# Patient Record
Sex: MALE | Race: WHITE | NOT HISPANIC OR LATINO | Employment: FULL TIME | URBAN - METROPOLITAN AREA
[De-identification: names, ages, dates, MRNs, and addresses within clinical notes are randomized per-mention and may not be internally consistent; named-entity substitution may affect disease eponyms.]

---

## 2018-11-06 ENCOUNTER — OFFICE VISIT (OUTPATIENT)
Dept: FAMILY MEDICINE | Facility: CLINIC | Age: 28
End: 2018-11-06
Payer: COMMERCIAL

## 2018-11-06 VITALS
BODY MASS INDEX: 40.42 KG/M2 | HEIGHT: 73 IN | DIASTOLIC BLOOD PRESSURE: 84 MMHG | RESPIRATION RATE: 20 BRPM | SYSTOLIC BLOOD PRESSURE: 136 MMHG | WEIGHT: 305 LBS | HEART RATE: 82 BPM | OXYGEN SATURATION: 99 %

## 2018-11-06 DIAGNOSIS — Z23 NEED FOR PROPHYLACTIC VACCINATION AND INOCULATION AGAINST INFLUENZA: ICD-10-CM

## 2018-11-06 DIAGNOSIS — Z23 NEED FOR PROPHYLACTIC VACCINATION WITH TETANUS-DIPHTHERIA (TD): ICD-10-CM

## 2018-11-06 DIAGNOSIS — Z00.00 ROUTINE HISTORY AND PHYSICAL EXAMINATION OF ADULT: Primary | ICD-10-CM

## 2018-11-06 DIAGNOSIS — Z13.220 LIPID SCREENING: ICD-10-CM

## 2018-11-06 DIAGNOSIS — Z13.1 SCREENING FOR DIABETES MELLITUS: ICD-10-CM

## 2018-11-06 LAB
ANION GAP SERPL CALCULATED.3IONS-SCNC: 10 MMOL/L (ref 3–14)
BASOPHILS # BLD AUTO: 0 10E9/L (ref 0–0.2)
BASOPHILS NFR BLD AUTO: 0.3 %
BUN SERPL-MCNC: 18 MG/DL (ref 7–30)
CALCIUM SERPL-MCNC: 9.4 MG/DL (ref 8.5–10.1)
CHLORIDE SERPL-SCNC: 106 MMOL/L (ref 94–109)
CO2 SERPL-SCNC: 24 MMOL/L (ref 20–32)
CREAT SERPL-MCNC: 0.89 MG/DL (ref 0.66–1.25)
DIFFERENTIAL METHOD BLD: NORMAL
EOSINOPHIL # BLD AUTO: 0.1 10E9/L (ref 0–0.7)
EOSINOPHIL NFR BLD AUTO: 0.8 %
ERYTHROCYTE [DISTWIDTH] IN BLOOD BY AUTOMATED COUNT: 12.7 % (ref 10–15)
GFR SERPL CREATININE-BSD FRML MDRD: >90 ML/MIN/1.7M2
GLUCOSE SERPL-MCNC: 89 MG/DL (ref 70–99)
HCT VFR BLD AUTO: 45 % (ref 40–53)
HGB BLD-MCNC: 14.7 G/DL (ref 13.3–17.7)
LYMPHOCYTES # BLD AUTO: 3.9 10E9/L (ref 0.8–5.3)
LYMPHOCYTES NFR BLD AUTO: 37.8 %
MCH RBC QN AUTO: 28.7 PG (ref 26.5–33)
MCHC RBC AUTO-ENTMCNC: 32.7 G/DL (ref 31.5–36.5)
MCV RBC AUTO: 88 FL (ref 78–100)
MONOCYTES # BLD AUTO: 0.9 10E9/L (ref 0–1.3)
MONOCYTES NFR BLD AUTO: 8.2 %
NEUTROPHILS # BLD AUTO: 5.5 10E9/L (ref 1.6–8.3)
NEUTROPHILS NFR BLD AUTO: 52.9 %
PLATELET # BLD AUTO: 289 10E9/L (ref 150–450)
POTASSIUM SERPL-SCNC: 3.8 MMOL/L (ref 3.4–5.3)
RBC # BLD AUTO: 5.12 10E12/L (ref 4.4–5.9)
SODIUM SERPL-SCNC: 140 MMOL/L (ref 133–144)
WBC # BLD AUTO: 10.4 10E9/L (ref 4–11)

## 2018-11-06 PROCEDURE — 99385 PREV VISIT NEW AGE 18-39: CPT | Performed by: NURSE PRACTITIONER

## 2018-11-06 PROCEDURE — 80048 BASIC METABOLIC PNL TOTAL CA: CPT | Performed by: NURSE PRACTITIONER

## 2018-11-06 PROCEDURE — 85025 COMPLETE CBC W/AUTO DIFF WBC: CPT | Performed by: NURSE PRACTITIONER

## 2018-11-06 PROCEDURE — 36415 COLL VENOUS BLD VENIPUNCTURE: CPT | Performed by: NURSE PRACTITIONER

## 2018-11-06 ASSESSMENT — ENCOUNTER SYMPTOMS
DYSURIA: 0
HEADACHES: 0
DIZZINESS: 0
ABDOMINAL PAIN: 0
HEMATURIA: 0
CHILLS: 0
NERVOUS/ANXIOUS: 0
HEARTBURN: 0
CONSTIPATION: 0
HEMATOCHEZIA: 0
SORE THROAT: 0
ARTHRALGIAS: 1
MYALGIAS: 0
FEVER: 0
EYE PAIN: 0
PALPITATIONS: 0
DIARRHEA: 0
PARESTHESIAS: 0
WEAKNESS: 0
COUGH: 0
SHORTNESS OF BREATH: 0
JOINT SWELLING: 0
NAUSEA: 0
FREQUENCY: 0

## 2018-11-06 ASSESSMENT — PAIN SCALES - GENERAL: PAINLEVEL: NO PAIN (0)

## 2018-11-06 NOTE — LETTER
[facility]  [address] [address] [phone]                                                                                                November 7, 2018    Temo Gallego  [address]  [place] [address]        Dear Mr. Gallego,    The results of your recent lab tests were within normal limits. Enclosed is a copy of these results.  If you have any further questions or problems, please contact our office.    Results for orders placed or performed in visit on 11/06/18   CBC with platelets differential   Result Value Ref Range    WBC 10.4 4.0 - 11.0 10e9/L    RBC Count 5.12 4.4 - 5.9 10e12/L    Hemoglobin 14.7 13.3 - 17.7 g/dL    Hematocrit 45.0 40.0 - 53.0 %    MCV 88 78 - 100 fl    MCH 28.7 26.5 - 33.0 pg    MCHC 32.7 31.5 - 36.5 g/dL    RDW 12.7 10.0 - 15.0 %    Platelet Count 289 150 - 450 10e9/L    % Neutrophils 52.9 %    % Lymphocytes 37.8 %    % Monocytes 8.2 %    % Eosinophils 0.8 %    % Basophils 0.3 %    Absolute Neutrophil 5.5 1.6 - 8.3 10e9/L    Absolute Lymphocytes 3.9 0.8 - 5.3 10e9/L    Absolute Monocytes 0.9 0.0 - 1.3 10e9/L    Absolute Eosinophils 0.1 0.0 - 0.7 10e9/L    Absolute Basophils 0.0 0.0 - 0.2 10e9/L    Diff Method Automated Method    Basic metabolic panel   Result Value Ref Range    Sodium 140 133 - 144 mmol/L    Potassium 3.8 3.4 - 5.3 mmol/L    Chloride 106 94 - 109 mmol/L    Carbon Dioxide 24 20 - 32 mmol/L    Anion Gap 10 3 - 14 mmol/L    Glucose 89 70 - 99 mg/dL    Urea Nitrogen 18 7 - 30 mg/dL    Creatinine 0.89 0.66 - 1.25 mg/dL    GFR Estimate >90 >60 mL/min/1.7m2    GFR Estimate If Black >90 >60 mL/min/1.7m2    Calcium 9.4 8.5 - 10.1 mg/dL     Sincerely,    Almita Rowland, NP /mv

## 2018-11-06 NOTE — PROGRESS NOTES
SUBJECTIVE:   CC: Temo Gallego is an 28 year old male who presents for preventative health visit.     Physical   Annual:     Getting at least 3 servings of Calcium per day:  Yes    Bi-annual eye exam:  Yes    Dental care twice a year:  NO    Sleep apnea or symptoms of sleep apnea:  None    Diet:  Regular (no restrictions)    Frequency of exercise:  2-3 days/week    Duration of exercise:  15-30 minutes    Taking medications regularly:  Yes    Medication side effects:  None    Additional concerns today:  Yes            Today's PHQ-2 Score:   PHQ-2 ( 1999 Pfizer) 11/6/2018   Q1: Little interest or pleasure in doing things 0   Q2: Feeling down, depressed or hopeless 0   PHQ-2 Score 0   Q1: Little interest or pleasure in doing things Not at all   Q2: Feeling down, depressed or hopeless Not at all   PHQ-2 Score 0       Abuse: Current or Past(Physical, Sexual or Emotional)- NO  Do you feel safe in your environment - YES    Social History   Substance Use Topics     Smoking status: Not on file     Smokeless tobacco: Not on file     Alcohol use Not on file     Alcohol Use 11/6/2018   If you drink alcohol do you typically have greater than 3 drinks per day OR greater than 7 drinks per week? No   No flowsheet data found.    Last PSA: No results found for: PSA    Reviewed orders with patient. Reviewed health maintenance and updated orders accordingly - Yes      Reviewed and updated as needed this visit by clinical staff         Reviewed and updated as needed this visit by Provider        {HISTORY OPTIONS (Optional):633742}    Review of Systems   Constitutional: Negative for chills and fever.   HENT: Negative for congestion, ear pain, hearing loss and sore throat.    Eyes: Negative for pain and visual disturbance.   Respiratory: Negative for cough and shortness of breath.    Cardiovascular: Negative for chest pain, palpitations and peripheral edema.   Gastrointestinal: Negative for abdominal pain, constipation, diarrhea,  "heartburn, hematochezia and nausea.   Genitourinary: Negative for discharge, dysuria, frequency, genital sores, hematuria, impotence and urgency.   Musculoskeletal: Positive for arthralgias. Negative for joint swelling and myalgias.   Skin: Negative for rash.   Neurological: Negative for dizziness, weakness, headaches and paresthesias.   Psychiatric/Behavioral: Negative for mood changes. The patient is not nervous/anxious.      {MALE ROS (Optional):759457::\"CONSTITUTIONAL: NEGATIVE for fever, chills, change in weight\",\"INTEGUMENTARY/SKIN: NEGATIVE for worrisome rashes, moles or lesions\",\"EYES: NEGATIVE for vision changes or irritation\",\"ENT: NEGATIVE for ear, mouth and throat problems\",\"RESP: NEGATIVE for significant cough or SOB\",\"CV: NEGATIVE for chest pain, palpitations or peripheral edema\",\"GI: NEGATIVE for nausea, abdominal pain, heartburn, or change in bowel habits\",\" male: negative for dysuria, hematuria, decreased urinary stream, erectile dysfunction, urethral discharge\",\"MUSCULOSKELETAL: NEGATIVE for significant arthralgias or myalgia\",\"NEURO: NEGATIVE for weakness, dizziness or paresthesias\",\"PSYCHIATRIC: NEGATIVE for changes in mood or affect\"}    OBJECTIVE:   There were no vitals taken for this visit.    Physical Exam  {Exam Choices (Optional):170478}    {Diagnostic Test Results (Optional):252717::\"Diagnostic Test Results:\",\"none \"}    ASSESSMENT/PLAN:   {Diag Picklist:538103}    COUNSELING:   {MALE COUNSELING MESSAGES:825946::\"Reviewed preventive health counseling, as reflected in patient instructions\"}    BP Readings from Last 1 Encounters:   No data found for BP     There is no height or weight on file to calculate BMI.    {BP Counseling- Complete if BP >= 120/80  (Optional):539717}  {Weight Management Plan (ACO) Complete if BMI is abnormal-  Ages 18-64  BMI >24.9.  Age 65+ with BMI <23 or >30 (Optional):444557}     has no tobacco history on file.  {Tobacco Cessation -- Complete if patient is a " smoker (Optional):561146}    Counseling Resources:  ATP IV Guidelines  Pooled Cohorts Equation Calculator  FRAX Risk Assessment  ICSI Preventive Guidelines  Dietary Guidelines for Americans, 2010  USDA's MyPlate  ASA Prophylaxis  Lung CA Screening    RODRIGUE [facility]  Answers for HPI/ROS submitted by the patient on 11/6/2018   PHQ-2 Score: 0

## 2018-11-06 NOTE — MR AVS SNAPSHOT
After Visit Summary   11/6/2018    Temo Gallego    MRN: [id number]           Patient Information     Date Of Birth          1990        Visit Information        Provider Department      11/6/2018 7:20 AM Almita Rowland APRN [facility]        Today's Diagnoses     Routine history and physical examination of adult    -  1    Body mass index (BMI) of 40.0-44.9 in adult (H)        Need for prophylactic vaccination and inoculation against influenza        Need for prophylactic vaccination with tetanus-diphtheria (Td)        Lipid screening        Screening for diabetes mellitus          Care Instructions      Preventive Health Recommendations  Male Ages 26 - 39    Yearly exam:             See your health care provider every year in order to  o   Review health changes.   o   Discuss preventive care.    o   Review your medicines if your doctor has prescribed any.    You should be tested each year for STDs (sexually transmitted diseases), if you re at risk.     After age 35, talk to your provider about cholesterol testing. If you are at risk for heart disease, have your cholesterol tested at least every 5 years.     If you are at risk for diabetes, you should have a diabetes test (fasting glucose).  Shots: Get a flu shot each year. Get a tetanus shot every 10 years.     Nutrition:    Eat at least 5 servings of fruits and vegetables daily.     Eat whole-grain bread, whole-wheat pasta and brown rice instead of white grains and rice.     Get adequate Calcium and Vitamin D.     Lifestyle    Exercise for at least 150 minutes a week (30 minutes a day, 5 days a week). This will help you control your weight and prevent disease.     Limit alcohol to one drink per day.     No smoking.     Wear sunscreen to prevent skin cancer.     See your dentist every six months for an exam and cleaning.   [facility]   Discharged by : Huma Spencer CMA    If you have any  questions regarding your visit please contact your care team:     Team Gold                Clinic Hours Telephone Number     Dr. Tere Tanner, ASA Rowland, CNP 7am-7pm  Monday - Thursday   7am-5pm  Fridays  [phone]   (Appointment scheduling available 24/7)     RN Line  [phone] option 2     Urgent Care - [place] - 11am-9pm Monday-Friday Saturday-Sunday- 9am-5pm     [place] -   5pm-9pm Monday-Friday Saturday-Sunday- 9am-5pm    [phone] - [place]    [phone] - [place]       For a Price Quote for your services, please call our Consumer Price Line at [phone].     What options do I have for visits at the clinic other than the traditional office visit?     To expand how we care for you, many of our providers are utilizing electronic visits (e-visits) and telephone visits, when medically appropriate, for interactions with their patients rather than a visit in the clinic. We also offer nurse visits for many medical concerns. Just like any other service, we will bill your insurance company for this type of visit based on time spent on the phone with your provider. Not all insurance companies cover these visits. Please check with your medical insurance if this type of visit is covered. You will be responsible for any charges that are not paid by your insurance.   E-visits via LUVHAN: generally incur a $35.00 fee.     Telephone visits:  Time spent on the phone: *charged based on time that is spent on the phone in increments of 10 minutes. Estimated cost:   5-10 mins $30.00   11-20 mins. $59.00   21-30 mins. $85.00       Use Noxilizert (secure email communication and access to your chart) to send your primary care provider a message or make an appointment. Ask someone on your Team how to sign up for Noxilizert.     As always, Thank you for trusting us with your health care needs!      [facility]:    Scheduling Appointments  [facility]  Call: [phone]    [facility]  Call: [phone]    [facility]  Call: [phone]    For Gastroenterology referrals   [facility]   Clinics and Surgery Center, 4th Floor   [address] [place] [address]   Appointments: [phone]    WHERE TO GO FOR CARE?  Clinic    Make an appointment if you:       Are sick (cold, cough, flu, sore throat, earache or in pain).       Have a small injury (sprain, small cut, burn or broken bone).       Need a physical exam, Pap smear, vaccine or prescription refill.       Have questions about your health or medicines.    To reach us:      Call [phone] ([phone]). Open 24 hours every day. (For counseling services, call [phone].)    Log into AppMesh at Spark The Fire. (Visit Workpop to create an account.) Hospital emergency room    An emergency is a serious or life- threatening problem that must be treated right away.    Call 431 or get to the hospital if you have:      Very bad or sudden:            - Chest pain or pressure         - Bleeding         - Head or belly pain         - Dizziness or trouble seeing, walking or                          Speaking      Problems breathing      Blood in your vomit or you are coughing up blood      A major injury (knocked out, loss of a finger or limb, rape, broken bone protruding from skin)    A mental health crisis. (Or call the Mental Health Crisis line at 1-551.841.4940 or Suicide Prevention Hotline at 1-784.670.9723.)    Open 24 hours every day. You don't need an appointment.     Urgent care    Visit urgent care for sickness or small injuries when the clinic is closed. You don't need an appointment. To check hours or find an urgent care near you, visit [email or web address]. Online care    Get online care from [facility] for more than 70 common  "problems, like colds, allergies and infections. Open 24 hours every day at:   [email or web address]   Need help deciding?    For advice about where to be seen, you may call your clinic and ask to speak with a nurse. We're here for you 24 hours every day.         If you are deaf or hard of hearing, please let us know. We provide many free services including sign language interpreters, oral interpreters, TTYs, telephone amplifiers, note takers and written materials.                   Follow-ups after your visit        Follow-up notes from your care team     Return if symptoms worsen or fail to improve.      Future tests that were ordered for you today     Open Future Orders        Priority Expected Expires Ordered    Lipid panel reflex to direct LDL Fasting Routine 12/4/2018 1/4/2019 11/6/2018            Who to contact     If you have questions or need follow up information about today's clinic visit or your schedule please contact [facility] directly at [phone].  Normal or non-critical lab and imaging results will be communicated to you by MyChart, letter or phone within 4 business days after the clinic has received the results. If you do not hear from us within 7 days, please contact the clinic through Fulhamhart or phone. If you have a critical or abnormal lab result, we will notify you by phone as soon as possible.  Submit refill requests through CanWeNetwork or call your pharmacy and they will forward the refill request to us. Please allow 3 business days for your refill to be completed.          Additional Information About Your Visit        FulhamharGamePlan Technologies Information     CanWeNetwork lets you send messages to your doctor, view your test results, renew your prescriptions, schedule appointments and more. To sign up, go to [email or web address] . Click on \"Log in\" on the left side of the screen, which will take you to the Welcome page. Then click on \"Sign up Now\" on the right side of the page.     You will be " "asked to enter the access code listed below, as well as some personal information. Please follow the directions to create your username and password.     Your access code is: [email or web address]  Expires: 2019  7:16 AM     Your access code will  in 90 days. If you need help or a new code, please call your [facility] or [phone].        Care EveryWhere ID     This is your Care EveryWhere ID. This could be used by other organizations to access your [place] medical records  [id number]        Your Vitals Were     Pulse Respirations Height Pulse Oximetry BMI (Body Mass Index)       82 20 6' 1.2\" (1.859 m) 99% 40.02 kg/m2        Blood Pressure from Last 3 Encounters:   18 136/84    Weight from Last 3 Encounters:   18 305 lb (138.3 kg)              We Performed the Following     Basic metabolic panel     CBC with platelets differential        Primary Care Provider Office Phone # Fax #    [facility] [phone]       [address] [place] [address]        Equal Access to Services     RYAN DOWNING : Hadii aad ku hadasho Soomaali, waaxda luqadaha, qaybta kaalmada adejoniyada, hilaria nair . So [facility] [phone].    ATENCIÓN: Si habla español, tiene a brunson disposición servicios gratuitos de asistencia lingüística. Joseph al [phone].    We comply with applicable federal civil rights laws and [place] laws. We do not discriminate on the basis of race, color, national origin, age, disability, sex, sexual orientation, or gender identity.            Thank you!     Thank you for choosing [facility]  for your care. Our goal is always to provide you with excellent care. Hearing back from our patients is one way we can continue to improve our services. Please take a few minutes to complete the written survey that you may receive in the mail after your visit with us. Thank you!             Your Updated Medication List - " Protect others around you: Learn how to safely use, store and throw away your medicines at www.disposemymeds.org.      Notice  As of 11/6/2018 10:10 AM    You have not been prescribed any medications.

## 2018-11-06 NOTE — PATIENT INSTRUCTIONS
Preventive Health Recommendations  Male Ages 26 - 39    Yearly exam:             See your health care provider every year in order to  o   Review health changes.   o   Discuss preventive care.    o   Review your medicines if your doctor has prescribed any.    You should be tested each year for STDs (sexually transmitted diseases), if you re at risk.     After age 35, talk to your provider about cholesterol testing. If you are at risk for heart disease, have your cholesterol tested at least every 5 years.     If you are at risk for diabetes, you should have a diabetes test (fasting glucose).  Shots: Get a flu shot each year. Get a tetanus shot every 10 years.     Nutrition:    Eat at least 5 servings of fruits and vegetables daily.     Eat whole-grain bread, whole-wheat pasta and brown rice instead of white grains and rice.     Get adequate Calcium and Vitamin D.     Lifestyle    Exercise for at least 150 minutes a week (30 minutes a day, 5 days a week). This will help you control your weight and prevent disease.     Limit alcohol to one drink per day.     No smoking.     Wear sunscreen to prevent skin cancer.     See your dentist every six months for an exam and cleaning.   [facility]   Discharged by : Huma Spencer CMA    If you have any questions regarding your visit please contact your care team:     Team [facility] Hours Telephone Number     Dr. Tere Tanner, ASA Rowland, CNP 7am-7pm  Monday - Thursday   7am-5pm  Fridays  [phone]   (Appointment scheduling available 24/7)     RN Line  [phone] option 2     Urgent Care - [place] and [place] - 11am-9pm Monday-Friday Saturday-Sunday- 9am-5pm     [place] -   5pm-9pm Monday-Friday Saturday-Sunday- 9am-5pm    [phone] - [place]i [place]    [phone] - Sukhjinder       For a Price Quote for your services, please call our  Consumer Simons Line at [phone].     What options do I have for visits at the clinic other than the traditional office visit?     To expand how we care for you, many of our providers are utilizing electronic visits (e-visits) and telephone visits, when medically appropriate, for interactions with their patients rather than a visit in the clinic. We also offer nurse visits for many medical concerns. Just like any other service, we will bill your insurance company for this type of visit based on time spent on the phone with your provider. Not all insurance companies cover these visits. Please check with your medical insurance if this type of visit is covered. You will be responsible for any charges that are not paid by your insurance.   E-visits via Molecular Detection: generally incur a $35.00 fee.     Telephone visits:  Time spent on the phone: *charged based on time that is spent on the phone in increments of 10 minutes. Estimated cost:   5-10 mins $30.00   11-20 mins. $59.00   21-30 mins. $85.00       Use Molecular Detection (secure email communication and access to your chart) to send your primary care provider a message or make an appointment. Ask someone on your Team how to sign up for Molecular Detection.     As always, Thank you for trusting us with your health care needs!      [facility]:    Scheduling Appointments  [place]  Call: [phone]    [facility]  Call: [phone]    [facility]  Call: [phone]    For Gastroenterology referrals   [facility]enter[facility]   Clinics and Surgery Center, 4th Floor   [address] [place] [address]   Appointments: [phone]    WHERE TO GO FOR CARE?  Clinic    Make an appointment if you:       Are sick (cold, cough, flu, sore throat, earache or in pain).       Have a small injury (sprain, small cut, burn or broken bone).       Need a physical exam, Pap smear, vaccine or prescription  refill.       Have questions about your health or medicines.    To reach us:      Call [phone] ([phone]). Open 24 hours every day. (For counseling services, call [phone].)    Log into Quantum Voyage at Matchalarm. (Visit [email or web address] [email or web address] [email or web address] to create an account.) Hospital emergency room    An emergency is a serious or life- threatening problem that must be treated right away.    Call 911 or get to the hospital if you have:      Very bad or sudden:            - Chest pain or pressure         - Bleeding         - Head or belly pain         - Dizziness or trouble seeing, walking or                          Speaking      Problems breathing      Blood in your vomit or you are coughing up blood      A major injury (knocked out, loss of a finger or limb, rape, broken bone protruding from skin)    A mental health crisis. (Or call the Mental Health Crisis line at 1-421.348.8594 or Suicide Prevention Hotline at 1-237.306.9151.)    Open 24 hours every day. You don't need an appointment.     Urgent care    Visit urgent care for sickness or small injuries when the clinic is closed. You don't need an appointment. To check hours or find an urgent care near you, visit [email or web address]. Online care    Get online [facility] from [facility] for more than 70 common problems, like colds, allergies and infections. Open 24 hours every day at:   [email or web address]   Need help deciding?    For advice about where to be seen, you may call your clinic and ask to speak with a nurse. We're here for you 24 hours every day.         If you are deaf or hard of hearing, please let us know. We provide many free services including sign language interpreters, oral interpreters, TTYs, telephone amplifiers, note takers and written materials.

## 2018-11-06 NOTE — PROGRESS NOTES
SUBJECTIVE:   CC: Temo Gallego is an 28 year old male who presents for preventative health visit.   NEW PATIENT/TRANSFER OF CARE3    Physical   Annual:     Getting at least 3 servings of Calcium per day:  Yes    Bi-annual eye exam:  Yes    Dental care twice a year:  NO    Sleep apnea or symptoms of sleep apnea:  None    Diet:  Regular (no restrictions)    Frequency of exercise:  4-5 days/week    Duration of exercise:  Other    Taking medications regularly:  Not Applicable    Additional concerns today:  YES    Just moved here from [place].   Joints- pain with weather changes       Weight loss  Body mass index is 40.02 kg/(m^2).  Fit bit and counts calories. Wants to lose about 50 lbs.     Elevated BP without diagnosis of HTN  BP Readings from Last 3 Encounters:   11/06/18 136/84       Flu shot at work  Tetanus 3 years ago. Due in 2025  Today's PHQ-2 Score:   PHQ-2 ( 1999 Pfizer) 11/6/2018   Q1: Little interest or pleasure in doing things 0   Q2: Feeling down, depressed or hopeless 0   PHQ-2 Score 0   Q1: Little interest or pleasure in doing things Not at all   Q2: Feeling down, depressed or hopeless Not at all   PHQ-2 Score 0       Abuse: Current or Past(Physical, Sexual or Emotional)- NO  Do you feel safe in your environment - YES    Social History   Substance Use Topics     Smoking status: Never Smoker     Smokeless tobacco: Never Used     Alcohol use Yes      Comment: occasionally      Alcohol Use 11/6/2018   If you drink alcohol do you typically have greater than 3 drinks per day OR greater than 7 drinks per week? No   No flowsheet data found.    Last PSA: No results found for: PSA    Reviewed orders with patient. Reviewed health maintenance and updated orders accordingly - Yes  Labs reviewed in EPIC    Reviewed and updated as needed this visit by clinical staff  Tobacco  Allergies  Meds  Med Hx  Fam Hx  Soc Hx        Reviewed and updated as needed this visit by Provider  Med Hx  Fam Hx            Review  "of Systems   Constitutional: Negative for chills and fever.   HENT: Negative for congestion, ear pain, hearing loss and sore throat.    Eyes: Negative for pain and visual disturbance.   Respiratory: Negative for cough and shortness of breath.    Cardiovascular: Negative for chest pain, palpitations and peripheral edema.   Gastrointestinal: Negative for abdominal pain, constipation, diarrhea, heartburn, hematochezia and nausea.   Genitourinary: Negative for discharge, dysuria, frequency, genital sores, hematuria, impotence and urgency.   Musculoskeletal: Positive for arthralgias. Negative for joint swelling and myalgias.   Skin: Negative for rash.   Neurological: Negative for dizziness, weakness, headaches and paresthesias.   Psychiatric/Behavioral: Negative for mood changes. The patient is not nervous/anxious.        OBJECTIVE:   /84 (BP Location: Right arm, Patient Position: Chair, Cuff Size: Adult Large)  Pulse 82  Resp 20  Ht 6' 1.2\" (1.859 m)  Wt 305 lb (138.3 kg)  SpO2 99%  BMI 40.02 kg/m2    Physical Exam  GENERAL: obese, healthy, alert and no distress  EYES: Eyes grossly normal to inspection, PERRL and conjunctivae and sclerae normal  HENT: ear canals and TM's normal, nose and mouth without ulcers or lesions  NECK: no adenopathy, no asymmetry, masses, or scars and thyroid normal to palpation  RESP: lungs clear to auscultation - no rales, rhonchi or wheezes  CV: regular rate and rhythm, normal S1 S2, no S3 or S4, no murmur, click or rub, no peripheral edema and peripheral pulses strong  ABDOMEN: soft, nontender, no hepatosplenomegaly, no masses and bowel sounds normal  MS: no gross musculoskeletal defects noted, no edema  SKIN: no suspicious lesions or rashes  NEURO: Normal strength and tone, mentation intact and speech normal  PSYCH: mentation appears normal, affect normal/bright    Diagnostic Test Results:  No results found for this or any previous visit (from the past 24 " "hour(s)).    ASSESSMENT/PLAN:   1. Routine history and physical examination of adult  Focused on weight management plan today and blood pressure. Initial reading above target.   - CBC with platelets differential  - Basic metabolic panel    2. Body mass index (BMI) of 40.0-44.9 in adult (H)  Fit Bit, will start training, calorie counts, works as corrections officers so walks a lot     3. Need for prophylactic vaccination and inoculation against influenza  Completed at work    4. Need for prophylactic vaccination with tetanus-diphtheria (Td)  Completed at work    5. Lipid screening  Today   - Lipid panel reflex to direct LDL Fasting; Future    6. Screening for diabetes mellitus  today      COUNSELING:   Reviewed preventive health counseling, as reflected in patient instructions       Regular exercise       Healthy diet/nutrition       weight loss    BP Readings from Last 1 Encounters:   11/06/18 136/84     Estimated body mass index is 40.02 kg/(m^2) as calculated from the following:    Height as of this encounter: 6' 1.2\" (1.859 m).    Weight as of this encounter: 305 lb (138.3 kg).    BP Screening:   Last 3 BP Readings:    BP Readings from Last 3 Encounters:   11/06/18 136/84       The following was recommended to the patient:  Re-screen BP within a year and recommended lifestyle modifications  Weight management plan: as above plan in place     reports that he has never smoked. He has never used smokeless tobacco.      Counseling Resources:  ATP IV Guidelines  Pooled Cohorts Equation Calculator  FRAX Risk Assessment  ICSI Preventive Guidelines  Dietary Guidelines for Americans, 2010  USDA's MyPlate  ASA Prophylaxis  Lung CA Screening    RODRIGUE Soto [facility]  Answers for HPI/ROS submitted by the patient on 11/6/2018   PHQ-2 Score: 0    "

## 2019-01-02 DIAGNOSIS — Z13.220 LIPID SCREENING: ICD-10-CM

## 2019-01-02 LAB
CHOLEST SERPL-MCNC: 186 MG/DL
HDLC SERPL-MCNC: 42 MG/DL
LDLC SERPL CALC-MCNC: 120 MG/DL
NONHDLC SERPL-MCNC: 144 MG/DL
TRIGL SERPL-MCNC: 122 MG/DL

## 2019-01-02 PROCEDURE — 36415 COLL VENOUS BLD VENIPUNCTURE: CPT | Performed by: NURSE PRACTITIONER

## 2019-01-02 PROCEDURE — 80061 LIPID PANEL: CPT | Performed by: NURSE PRACTITIONER

## 2019-09-25 ENCOUNTER — OFFICE VISIT (OUTPATIENT)
Dept: FAMILY MEDICINE | Facility: CLINIC | Age: 29
End: 2019-09-25
Payer: COMMERCIAL

## 2019-09-25 VITALS
HEART RATE: 92 BPM | SYSTOLIC BLOOD PRESSURE: 126 MMHG | BODY MASS INDEX: 41.75 KG/M2 | WEIGHT: 315 LBS | OXYGEN SATURATION: 97 % | DIASTOLIC BLOOD PRESSURE: 76 MMHG | TEMPERATURE: 98.2 F | HEIGHT: 73 IN

## 2019-09-25 DIAGNOSIS — E66.01 CLASS 3 SEVERE OBESITY DUE TO EXCESS CALORIES WITHOUT SERIOUS COMORBIDITY WITH BODY MASS INDEX (BMI) OF 40.0 TO 44.9 IN ADULT (H): ICD-10-CM

## 2019-09-25 DIAGNOSIS — E66.813 CLASS 3 SEVERE OBESITY DUE TO EXCESS CALORIES WITHOUT SERIOUS COMORBIDITY WITH BODY MASS INDEX (BMI) OF 40.0 TO 44.9 IN ADULT (H): ICD-10-CM

## 2019-09-25 DIAGNOSIS — S39.012D STRAIN OF LUMBAR REGION, SUBSEQUENT ENCOUNTER: Primary | ICD-10-CM

## 2019-09-25 PROCEDURE — 99214 OFFICE O/P EST MOD 30 MIN: CPT | Performed by: NURSE PRACTITIONER

## 2019-09-25 RX ORDER — CYCLOBENZAPRINE HCL 10 MG
10 TABLET ORAL 3 TIMES DAILY PRN
COMMUNITY
End: 2020-11-27

## 2019-09-25 RX ORDER — FEXOFENADINE HCL AND PSEUDOEPHEDRINE HCL 180; 240 MG/1; MG/1
1 TABLET, EXTENDED RELEASE ORAL DAILY
COMMUNITY

## 2019-09-25 RX ORDER — IBUPROFEN 200 MG
800 TABLET ORAL EVERY 4 HOURS PRN
COMMUNITY

## 2019-09-25 RX ORDER — TRAMADOL HYDROCHLORIDE 50 MG/1
50 TABLET ORAL EVERY 6 HOURS PRN
Qty: 10 TABLET | Refills: 0 | Status: SHIPPED | OUTPATIENT
Start: 2019-09-25 | End: 2019-09-28

## 2019-09-25 ASSESSMENT — PAIN SCALES - GENERAL: PAINLEVEL: SEVERE PAIN (6)

## 2019-09-25 ASSESSMENT — MIFFLIN-ST. JEOR: SCORE: 2484

## 2019-09-25 NOTE — PATIENT INSTRUCTIONS
Passive spinal twist both sides             Figure four stretch        Do these two to three times daily and follow up in two weeks if not improved.    Increase the Ibuprofen to 600 mg three times daily for the next 7-10 days      For your Physical- bring a 2 week diary of food/drink/movement    Obesity Diet Plan    1,700 calorie meal plan to lose 1 lbs weekly without exercise  Use meal replacements such as Isabel's meals, Lean Cuisines, Healthy Choice, Smart Ones, Weight Watchers Meals, and Slim Fast and Glucerna shakes and supplement with fresh fruits and vegetables  Please drink a lot of water daily. Most people typically need about 2 liters of water daily and more if they are exercising, have a large weight, or have a fever or illness. Add Crystal Light for flavoring if desired. But no pop with calories in it.  Please keep a food journal of what you eat, calories in what you eat, and mood and bring the journal with you to your next appointment.  Consider using applications for smart phones such as Punchey, Zaiseoul, GizmoxRecipes, LoseIt, Tap&Track, and RelaxM.  Focus on wet volumetrics, meaning, eat more foods that are high in water and fiber such as fruits and vegetables in order to get that full feeling. These are also good for your overall health as well.  Check out Dr. Elena Cardoza from [facility] - she has cookbooks with low calorie volumetric recipes  You can try Let's Dish to help you prepare meals for you and your family. Often times, the caloric and nutrition data and serving sizes are available for this food. This can be a time saving maneuver. The website can give you more information http://www.ScholarPRO.Actimize/  Reynaldo Delivers has Let's Dish & fresh low calorie salads  Check out Hello Fresh at https://www.AutoShag/food-boxes/classic-box/  Try Cooking Light recipes for low calorie meal preparation and planning  Other food plan options you can search for on the internet and check out  "include: Yulissa BARRIOS, [facility]  Please consider health psychologist to discuss how depression and/or anxiety impact your eating.  Progressively increase physical activity to 60 minutes, including combination of cardio & resistance training x 5 days weekly.     Consider hiring a  to develop a structured progressive workout plan that includes both cardio and resistance training. Obesity is a disease according to the IRS, so you may be able to use some pre-tax dollars from your medical flexible spending account if you get a letter from your doctor and/or fill out a plan-specific form.      Mindful Eating  Listening to what your body is  telling  you.  You may have tried diets and eating plans in an effort to change your weight or control your eating. Diets usually focus on a plan full of numbers, lists or \"allowed\" and \"not allowed foods.\"  Mindful eating is a different approach that focuses on paying attention to the signals your body naturally gives for hunger and satiety (satisfaction). Eating mindfully can help you to truly enjoy meals, change your weight, develop healthy eating habits and improve your sense of well-being.  How do I recognize when I m hungry or satisfied?    Signs of hunger and satiety   People experience different hunger and satiety cues. Common signs include the following.    Hunger    rumbling stomach      unpleasant stomach contractions (hunger pangs)      mild lightheadedness      difficulty concentrating      irritability      faintness      headaches    Satiety    increasing tightness across your abdomen      physical satisfaction      decreasing pleasure in food      disappearance of hunger cues    When you re very hungry, you may find yourself eating more rapidly and then gradually slowing down as you become satisfied. When you ve overeaten, or eaten until you feel full or stuffed, your clothing may feel tighter. You also may feel sluggish, sleepy or " tired.    Measurement of hunger and satiety   Another way to help recognize the sensations of hunger and satiety is to measure them. Rate your hunger and satiety on the scale below from 0 to 10, with 0 being starved and 10 being stuffed. It is helpful to evaluate your satiety a few times throughout the meal or snack.  Eat when you re hungry (about 1 to 2 on the scale). Stop eating when you re satisfied (about 5 to 6). F requently waiting to eat until you're starved (0 on the scale) or eating until you're full or stuffed (7 through 10) may start a pattern of swinging wildly between these two extremes.  Learning to eat when you re hungry and stopping when you re satisfied helps decrease your chance of swinging between feeling starved and feeling stuffed. This also can prevent emotional eating (for example, when you are stressed or bored).  0          1          2          3          4          5          6          7          8          9          10        Starved  Hungry  Neutral  Satisfied  Full  Stuffed    Does eating regular meals and snacks help hunger signals become clearer?  Yes. Eating regularly reminds your body what natural eating feels like. Your body sends out hunger cues at times when you're used to eating or feeling hungry.  Once you re hungry, it s important to take time to eat and enjoy the experience. Mindful eating helps your body send satisfied cues that you've had a meal and eaten enough. Consistently eating every four to five hours provides fuel for your body throughout the day.   Is it OK to eat at my desk, in the car or while watching TV?  Eating while doing some other activity takes the focus away from eating and puts you out of touch with your body's signals of hunger and satiety. The activity distracts from mindful eating. People often take a few bites of food and then continue eating while watching the TV, making a telephone call, searching the Internet, reading the newspaper or doing other  "activities. Activity prevents you from thoroughly smelling, seeing, tasting and experiencing what you're eating.    What steps should I take to start eating mindfully?  Mindful eating takes attention, time and practice. To develop stronger mindful eating skills, you may need to try different strategies to find what works best for you. Follow these tips to help make mindful eating a regular daily habit. Enjoy food and good health.    Set a consistent schedule for eating. Some people struggle with irregular eating habits, which may cause weight gain.    Eat a meal every four to five hours. This amount of time between meals allows your body to become hungry and send out hunger signals. If you find that you get too hungry between meals, schedule a snack.    Take time to eat. Make meals last 15 to 30 minutes. Experience meals instead of eating on the go. Savor and enjoy your food. Give your body a chance to become satisfied and let your brain know.    Eat at a table with a place setting. This helps send your body the message that you're having a meal. Eating while driving isn't a relaxing eating experience and often unsafe.    Eat with other people. Eating (and making) a meal with others can be an enjoyable shared experience and help in planning and scheduling regular healthy meals. Mindful eating involves choosing foods that are nourishing and pleasing. Knowing what food you'll be eating can result in a fulfilling meal. Randomly opening cupboards or the refrigerator, and eating while standing in the kitchen, on the other hand, often leads to feeling unsatisfied and to overeating.    Use the hunger scale. Listen for hunger and satiety cues. Pay attention to what your body is telling you. What does your body do when it is hungry? What does it feel like when you've eaten enough? Be aware of how your emotions affect your eating. Are there times and situations when you eat when you're not hungry?    Be \"in the moment.\" " People often are doing other things when they're eating. Thinking about the amount of calories in food, watching TV or working on the computer can distract you from your eating experience. Be mindful and enjoy the pleasure of eating and eating healthfully.    Resources    The Center for Mindful Eating  www.tcme.org    Intuitive Eating: A Revolutionary Program That Works by Myrna Dean, MS, RD and Krys Aguilar MS, RD, [place]. [place]: [facility] [employer], 2003.    Mindless Eating: Why We Eat More Than We Think by Jaden Mora, PhD. [place]: [employer], 2006.    Eating Mindfully: How to End Mindless Eating and Enjoy a Balanced Relationship with Food by Aysha Chauhan PsyD. [place], [place]: [employer], 2003.

## 2019-09-25 NOTE — PROGRESS NOTES
"Subjective     Temo Gallego is a 29 year old male who presents to clinic today for the following health issues:    HPI     ED/UC Followup:    Facility:   [facility]gent [facility]- [place]  Date of visit: 9/21/19  Reason for visit: Strain of lumbar region  Current Status: Symptoms about the same       Low Back Pain       Duration: 9/20/19- he woke up with the pain, no injury        Specific cause: none    Description:   Location of pain: low back  center  Character of pain: \"crushing pain\"  Pain radiation: none  New numbness or weakness in legs, not attributed to pain:  no     Intensity: Currently 6/10, At its worst 7/10    History:   Pain interferes with job: YES  History of back problems: no prior back problems  Any previous MRI or X-rays: None  Sees a specialist for back pain:  No  Therapies tried without relief: Flexeril, Advil    Alleviating factors:   Improved by:  None      Precipitating factors:  Worsened by:  Any movement  Any position changes worsens the pain.      Accompanying Signs & Symptoms:  Risk of Fracture:  None  Risk of Cauda Equina:  None  Risk of Infection:  None  Risk of Cancer:  None  Risk of Ankylosing Spondylitis:  Onset at age <35, male, AND morning back stiffness. no     He is taking Advil 400 mg every 4 hours.  Flexeril 10 mg does not help with the back pain.    Obesity: He feels like he tracks his calorie intake and energy expenditure fairly well.  He takes in about 8018-4745 dane/day and burns about 500 dane more than this.  Instead of losing weight he has been gaining weight.  He is frustrated by this because he would like to lose weight.    Patient Active Problem List   Diagnosis     Class 3 severe obesity due to excess calories without serious comorbidity with body mass index (BMI) of 40.0 to 44.9 in adult (H)     Body mass index (BMI) of 40.0-44.9 in adult (H)     Past Surgical History:   Procedure Laterality Date     HERNIA REPAIR  12/18/2002ish     SOFT TISSUE SURGERY  June 2009, July 2012 " "   R shoulder and L Knee respectively       Social History     Tobacco Use     Smoking status: Never Smoker     Smokeless tobacco: Never Used   Substance Use Topics     Alcohol use: Yes     Comment: Very occasionally     Family History   Problem Relation Age of Onset     Sleep Apnea Mother      Skin Cancer Father      Other Cancer Father      Breast Cancer Maternal Grandmother      Breast Cancer Paternal Grandmother      Colon Cancer Paternal Grandfather      Breast Cancer Paternal Aunt          Current Outpatient Medications   Medication Sig Dispense Refill     cyclobenzaprine (FLEXERIL) 10 MG tablet Take 10 mg by mouth 3 times daily as needed for muscle spasms       fexofenadine-pseudoePHEDrine (ALLEGRA-D 24) 180-240 MG 24 hr tablet Take 1 tablet by mouth daily       ibuprofen (ADVIL/MOTRIN) 200 MG tablet Taking 400 mg every 4 hours for pain              No Known Allergies  BP Readings from Last 3 Encounters:   09/25/19 126/76   11/06/18 136/84    Wt Readings from Last 3 Encounters:   09/25/19 146.5 kg (323 lb)   11/06/18 138.3 kg (305 lb)                    Reviewed and updated as needed this visit by Provider  Tobacco  Allergies  Meds  Problems  Med Hx  Surg Hx  Fam Hx         Review of Systems   ROS COMP: Constitutional, HEENT, cardiovascular, pulmonary, gi and gu systems are negative, except as otherwise noted.      Objective    /76 (BP Location: Right arm, Patient Position: Sitting, Cuff Size: Adult Large)   Pulse 92   Temp 98.2  F (36.8  C) (Oral)   Ht 1.854 m (6' 1\")   Wt 146.5 kg (323 lb)   SpO2 97%   BMI 42.61 kg/m    Body mass index is 42.61 kg/m .  Physical Exam   GENERAL: healthy, alert, no distress and obese  RESP: lungs clear to auscultation - no rales, rhonchi or wheezes  CV: regular rate and rhythm, normal S1 S2, no S3 or S4, no murmur, click or rub  Comprehensive back pain exam:  Tenderness of lumbar spine midline, Range of motion not limited by pain although patient does report " "pain with all motions and Straight leg raise negative bilaterally  PSYCH: mentation appears normal, affect normal/bright          Assessment & Plan     1. Strain of lumbar region, subsequent encounter    - [facility] PT, HAND, AND CHIROPRACTIC REFERRAL; Future  - traMADol (ULTRAM) 50 MG tablet; Take 1 tablet (50 mg) by mouth every 6 hours as needed for severe pain  Dispense: 10 tablet; Refill: 0  - Increase to Ibuprofen 600 mg three times daily for the next 7-10 days.  -I recommend patient to go to physical therapy for further treatment of suspected lumbar strain.  Discussed it can take 4 to 6 weeks for full improvement of symptoms, but he should return sooner if his symptoms worsen despite treatment plan.    2. Class 3 severe obesity due to excess calories without serious comorbidity with body mass index (BMI) of 40.0 to 44.9 in adult (H)  Discussed that losing weight can improve the overall health of his back.  Patient will plan to bring in the 2-week of food/fluids/activity diary when he comes for his physical.  Will consider referral for nutrition consult at that time.       BMI:   Estimated body mass index is 42.61 kg/m  as calculated from the following:    Height as of this encounter: 1.854 m (6' 1\").    Weight as of this encounter: 146.5 kg (323 lb).   Weight management plan: Patient to complete 2-week food diary and bring to his physical            Return in about 6 weeks (around 11/6/2019) for Physical Exam.    Dania Tanner, NP  [facility]    "

## 2020-03-11 ENCOUNTER — HEALTH MAINTENANCE LETTER (OUTPATIENT)
Age: 30
End: 2020-03-11

## 2020-11-27 ENCOUNTER — VIRTUAL VISIT (OUTPATIENT)
Dept: FAMILY MEDICINE | Facility: CLINIC | Age: 30
End: 2020-11-27
Payer: COMMERCIAL

## 2020-11-27 DIAGNOSIS — Z11.59 SCREENING FOR VIRAL DISEASE: Primary | ICD-10-CM

## 2020-11-27 PROCEDURE — 99213 OFFICE O/P EST LOW 20 MIN: CPT | Mod: 95 | Performed by: NURSE PRACTITIONER

## 2020-11-27 NOTE — PROGRESS NOTES
"Temo Gallego is a 30 year old male who is being evaluated via a billable telephone visit.      The patient has been notified of following:     \"This telephone visit will be conducted via a call between you and your physician/provider. We have found that certain health care needs can be provided without the need for a physical exam.  This service lets us provide the care you need with a short phone conversation.  If a prescription is necessary we can send it directly to your pharmacy.  If lab work is needed we can place an order for that and you can then stop by our lab to have the test done at a later time.    Telephone visits are billed at different rates depending on your insurance coverage. During this emergency period, for some insurers they may be billed the same as an in-person visit.  Please reach out to your insurance provider with any questions.    If during the course of the call the physician/provider feels a telephone visit is not appropriate, you will not be charged for this service.\"    Patient has given verbal consent for Telephone visit?  Yes    What phone number would you like to be contacted at? [phone]    How would you like to obtain your AVS? Tao Jacinto     Temo Gallego is a 30 year old male who presents via phone visit today for the following health issues:    HPI     Would like a covid 19  test , will be traveling from 12/15/20 and returning on 12/24/2020, He has no currently symptoms and does get a temperature check at work daily.        [facility] to [facility] a COVID test that is negative dated within 72 hours of travel.  For this reason, requesting a Covid test.    Review of Systems   Constitutional, HEENT, cardiovascular, pulmonary, gi and gu systems are negative, except as otherwise noted.       Objective          Vitals:  No vitals were obtained today due to virtual visit.    healthy, alert and no distress  PSYCH: Alert and oriented times 3; " coherent speech, normal   rate and volume, able to articulate logical thoughts, able   to abstract reason, no tangential thoughts, no hallucinations   or delusions  His affect is normal  RESP: No cough, no audible wheezing, able to talk in full sentences  Remainder of exam unable to be completed due to telephone visits          Assessment/Plan:    Assessment & Plan     Screening for viral disease    - Asymptomatic COVID-19 Virus (Coronavirus) by PCR; Future          Return in about 1 year (around 11/27/2021) for Physical Exam.    Dania Tanner NP  [facility]    Phone call duration:  6 minutes with review of chart, review of patient concerns and review of ongoing plans.    Telephone visit (rather than a office visit) done today due to COVID-19 pandemic.

## 2020-12-12 DIAGNOSIS — Z11.59 SCREENING FOR VIRAL DISEASE: ICD-10-CM

## 2020-12-12 LAB
SARS-COV-2 RNA SPEC QL NAA+PROBE: NORMAL
SPECIMEN SOURCE: NORMAL

## 2020-12-12 PROCEDURE — U0003 INFECTIOUS AGENT DETECTION BY NUCLEIC ACID (DNA OR RNA); SEVERE ACUTE RESPIRATORY SYNDROME CORONAVIRUS 2 (SARS-COV-2) (CORONAVIRUS DISEASE [COVID-19]), AMPLIFIED PROBE TECHNIQUE, MAKING USE OF HIGH THROUGHPUT TECHNOLOGIES AS DESCRIBED BY CMS-2020-01-R: HCPCS | Performed by: NURSE PRACTITIONER

## 2020-12-13 LAB
LABORATORY COMMENT REPORT: NORMAL
SARS-COV-2 RNA SPEC QL NAA+PROBE: NEGATIVE
SPECIMEN SOURCE: NORMAL

## 2021-01-03 ENCOUNTER — HEALTH MAINTENANCE LETTER (OUTPATIENT)
Age: 31
End: 2021-01-03

## 2021-01-27 ENCOUNTER — OFFICE VISIT (OUTPATIENT)
Dept: FAMILY MEDICINE | Facility: CLINIC | Age: 31
End: 2021-01-27
Payer: COMMERCIAL

## 2021-01-27 VITALS
DIASTOLIC BLOOD PRESSURE: 82 MMHG | SYSTOLIC BLOOD PRESSURE: 134 MMHG | HEIGHT: 72 IN | OXYGEN SATURATION: 99 % | BODY MASS INDEX: 41.85 KG/M2 | WEIGHT: 309 LBS | HEART RATE: 68 BPM

## 2021-01-27 DIAGNOSIS — E66.813 CLASS 3 SEVERE OBESITY DUE TO EXCESS CALORIES WITHOUT SERIOUS COMORBIDITY WITH BODY MASS INDEX (BMI) OF 40.0 TO 44.9 IN ADULT (H): ICD-10-CM

## 2021-01-27 DIAGNOSIS — E66.01 CLASS 3 SEVERE OBESITY DUE TO EXCESS CALORIES WITHOUT SERIOUS COMORBIDITY WITH BODY MASS INDEX (BMI) OF 40.0 TO 44.9 IN ADULT (H): ICD-10-CM

## 2021-01-27 DIAGNOSIS — Z23 NEED FOR PROPHYLACTIC VACCINATION AND INOCULATION AGAINST INFLUENZA: ICD-10-CM

## 2021-01-27 DIAGNOSIS — Z11.4 ENCOUNTER FOR SCREENING FOR HIV: ICD-10-CM

## 2021-01-27 DIAGNOSIS — Z00.00 ROUTINE GENERAL MEDICAL EXAMINATION AT A HEALTH CARE FACILITY: Primary | ICD-10-CM

## 2021-01-27 LAB
HCV AB SERPL QL IA: NONREACTIVE
HIV 1+2 AB+HIV1 P24 AG SERPL QL IA: NONREACTIVE

## 2021-01-27 PROCEDURE — 36415 COLL VENOUS BLD VENIPUNCTURE: CPT | Performed by: FAMILY MEDICINE

## 2021-01-27 PROCEDURE — 86803 HEPATITIS C AB TEST: CPT | Performed by: FAMILY MEDICINE

## 2021-01-27 PROCEDURE — 80053 COMPREHEN METABOLIC PANEL: CPT | Performed by: FAMILY MEDICINE

## 2021-01-27 PROCEDURE — 80061 LIPID PANEL: CPT | Performed by: FAMILY MEDICINE

## 2021-01-27 PROCEDURE — 99395 PREV VISIT EST AGE 18-39: CPT | Mod: 25 | Performed by: FAMILY MEDICINE

## 2021-01-27 PROCEDURE — 87389 HIV-1 AG W/HIV-1&-2 AB AG IA: CPT | Performed by: FAMILY MEDICINE

## 2021-01-27 PROCEDURE — 90686 IIV4 VACC NO PRSV 0.5 ML IM: CPT | Performed by: FAMILY MEDICINE

## 2021-01-27 PROCEDURE — 90471 IMMUNIZATION ADMIN: CPT | Performed by: FAMILY MEDICINE

## 2021-01-27 RX ORDER — FINASTERIDE 1 MG/1
TABLET, FILM COATED ORAL
COMMUNITY
Start: 2020-08-01 | End: 2023-08-24

## 2021-01-27 ASSESSMENT — ENCOUNTER SYMPTOMS
HEARTBURN: 0
DYSURIA: 0
NAUSEA: 0
HEMATOCHEZIA: 0
SORE THROAT: 0
CONSTIPATION: 0
DIARRHEA: 0
JOINT SWELLING: 0
FREQUENCY: 0
DIZZINESS: 0
PARESTHESIAS: 0
ABDOMINAL PAIN: 0
ARTHRALGIAS: 0
HEADACHES: 0
HEMATURIA: 0
NERVOUS/ANXIOUS: 0
WEAKNESS: 0
PALPITATIONS: 0
CHILLS: 0
MYALGIAS: 0
SHORTNESS OF BREATH: 0
COUGH: 0
FEVER: 0
EYE PAIN: 0

## 2021-01-27 ASSESSMENT — MIFFLIN-ST. JEOR: SCORE: 2399.61

## 2021-01-27 NOTE — PROGRESS NOTES
SUBJECTIVE:   CC: Temo Gallego is an 30 year old male who presents for preventative health visit.       Patient has been advised of split billing requirements and indicates understanding: Yes  Healthy Habits:     Getting at least 3 servings of Calcium per day:  Yes    Bi-annual eye exam:  Yes    Dental care twice a year:  NO    Sleep apnea or symptoms of sleep apnea:  None    Diet:  Regular (no restrictions) and Other    Frequency of exercise:  2-3 days/week    Duration of exercise:  45-60 minutes    Taking medications regularly:  Yes    Medication side effects:  None    PHQ-2 Total Score: 0    Additional concerns today:  Yes        Today's PHQ-2 Score:   PHQ-2 ( 1999 Pfizer) 1/27/2021   Q1: Little interest or pleasure in doing things 0   Q2: Feeling down, depressed or hopeless 0   PHQ-2 Score 0   Q1: Little interest or pleasure in doing things Not at all   Q2: Feeling down, depressed or hopeless Not at all   PHQ-2 Score 0       Abuse: Current or Past(Physical, Sexual or Emotional)- No  Do you feel safe in your environment? Yes        Social History     Tobacco Use     Smoking status: Never Smoker     Smokeless tobacco: Never Used   Substance Use Topics     Alcohol use: Yes     Comment: 1 drink per dinner. 7 per week tops     If you drink alcohol do you typically have >3 drinks per day or >7 drinks per week? Not applicable    Alcohol Use 1/27/2021   Prescreen: >3 drinks/day or >7 drinks/week? No   Prescreen: >3 drinks/day or >7 drinks/week? -   No flowsheet data found.    Last PSA: No results found for: PSA    Reviewed orders with patient. Reviewed health maintenance and updated orders accordingly - Yes  BP Readings from Last 3 Encounters:   01/27/21 134/82   09/25/19 126/76   11/06/18 136/84    Wt Readings from Last 3 Encounters:   01/27/21 140.2 kg (309 lb)   09/25/19 146.5 kg (323 lb)   11/06/18 138.3 kg (305 lb)                  Patient Active Problem List   Diagnosis     Class 3 severe obesity due to excess  calories without serious comorbidity with body mass index (BMI) of 40.0 to 44.9 in adult (H)     Body mass index (BMI) of 40.0-44.9 in adult (H)     Past Surgical History:   Procedure Laterality Date     HERNIA REPAIR  12/18/2002ish     SOFT TISSUE SURGERY  June 2009, July 2012    R shoulder and L Knee respectively       Social History     Tobacco Use     Smoking status: Never Smoker     Smokeless tobacco: Never Used   Substance Use Topics     Alcohol use: Yes     Comment: 1 drink per dinner. 7 per week tops     Family History   Problem Relation Age of Onset     Sleep Apnea Mother      Skin Cancer Father      Other Cancer Father      Breast Cancer Maternal Grandmother      Breast Cancer Paternal Grandmother      Colon Cancer Paternal Grandfather      Breast Cancer Paternal Aunt          Current Outpatient Medications   Medication Sig Dispense Refill     fexofenadine-pseudoePHEDrine (ALLEGRA-D 24) 180-240 MG 24 hr tablet Take 1 tablet by mouth daily       finasteride (PROPECIA) 1 MG tablet        ibuprofen (ADVIL/MOTRIN) 200 MG tablet Take 800 mg by mouth every 4 hours as needed for mild pain       No Known Allergies  Recent Labs   Lab Test 01/02/19  0819 11/06/18  0757   *  --    HDL 42  --    TRIG 122  --    CR  --  0.89   GFRESTIMATED  --  >90   GFRESTBLACK  --  >90   POTASSIUM  --  3.8        Reviewed and updated as needed this visit by clinical staff  Tobacco  Allergies  Meds   Med Hx  Surg Hx  Fam Hx  Soc Hx        Reviewed and updated as needed this visit by Provider                History reviewed. No pertinent past medical history.   Past Surgical History:   Procedure Laterality Date     HERNIA REPAIR  12/18/2002ish     SOFT TISSUE SURGERY  June 2009, July 2012    R shoulder and L Knee respectively     OB History   No obstetric history on file.       Review of Systems   Constitutional: Negative for chills and fever.   HENT: Negative for congestion, ear pain, hearing loss and sore throat.     Eyes: Negative for pain and visual disturbance.   Respiratory: Negative for cough and shortness of breath.    Cardiovascular: Negative for chest pain, palpitations and peripheral edema.   Gastrointestinal: Negative for abdominal pain, constipation, diarrhea, heartburn, hematochezia and nausea.   Genitourinary: Negative for discharge, dysuria, frequency, genital sores, hematuria, impotence and urgency.   Musculoskeletal: Negative for arthralgias, joint swelling and myalgias.   Skin: Negative for rash.   Neurological: Negative for dizziness, weakness, headaches and paresthesias.   Psychiatric/Behavioral: Negative for mood changes. The patient is not nervous/anxious.      CONSTITUTIONAL: NEGATIVE for fever, chills, change in weight  INTEGUMENTARY/SKIN: NEGATIVE for worrisome rashes, moles or lesions  EYES: NEGATIVE for vision changes or irritation  ENT: NEGATIVE for ear, mouth and throat problems  RESP: NEGATIVE for significant cough or SOB  CV: NEGATIVE for chest pain, palpitations or peripheral edema  GI: NEGATIVE for nausea, abdominal pain, heartburn, or change in bowel habits   male: negative for dysuria, hematuria, decreased urinary stream, erectile dysfunction, urethral discharge  MUSCULOSKELETAL: NEGATIVE for significant arthralgias or myalgia  NEURO: NEGATIVE for weakness, dizziness or paresthesias  ENDOCRINE: NEGATIVE for temperature intolerance, skin/hair changes  HEME/ALLERGY/IMMUNE: NEGATIVE for bleeding problems  PSYCHIATRIC: NEGATIVE for changes in mood or affect    OBJECTIVE:   There were no vitals taken for this visit.    Physical Exam  GENERAL: healthy, alert and no distress  HENT: ear canals and TM's normal, nose and mouth without ulcers or lesions  NECK: no adenopathy, no asymmetry, masses, or scars and thyroid normal to palpation  RESP: lungs clear to auscultation - no rales, rhonchi or wheezes  CV: regular rate and rhythm, normal S1 S2, no S3 or S4, no murmur, click or rub, no peripheral edema  and peripheral pulses strong  ABDOMEN: soft, nontender, no hepatosplenomegaly, no masses and bowel sounds normal  MS: no gross musculoskeletal defects noted, no edema  SKIN: no suspicious lesions or rashes  NEURO: Normal strength and tone, mentation intact and speech normal  PSYCH: mentation appears normal, affect normal/bright    Diagnostic Test Results:  Labs reviewed in Epic  Orders Placed This Encounter   Procedures     INFLUENZA VACCINE IM > 6 MONTHS VALENT IIV4 [48483]     Lipid panel reflex to direct LDL Fasting     Comprehensive metabolic panel (BMP + Alb, Alk Phos, ALT, AST, Total. Bili, TP)     HIV Antigen Antibody Combo     **Hepatitis C Screen Reflex to RNA FUTURE anytime     ASSESSMENT/PLAN:   1. Routine general medical examination at a health care facility  Routine preventive care discussed.  Given influenza vaccine today.  1 year follow-up recommended  - Lipid panel reflex to direct LDL Fasting  - Comprehensive metabolic panel (BMP + Alb, Alk Phos, ALT, AST, Total. Bili, TP)  - HIV Antigen Antibody Combo  - **Hepatitis C Screen Reflex to RNA FUTURE anytime    2. Class 3 severe obesity due to excess calories without serious comorbidity with body mass index (BMI) of 40.0 to 44.9 in adult (H)  Discussed diet and weight loss    3. Encounter for screening for HIV    - **Hepatitis C Screen Reflex to RNA FUTURE anytime    4. Need for prophylactic vaccination and inoculation against influenza  given  - INFLUENZA VACCINE IM > 6 MONTHS VALENT IIV4 [26103]    Patient has been advised of split billing requirements and indicates understanding: Yes  COUNSELING:   Reviewed preventive health counseling, as reflected in patient instructions       Regular exercise       Healthy diet/nutrition       Vision screening       Hearing screening       Immunizations    Vaccinated for: Influenza             Consider Hep C screening for all patients one time for ages 18-79 years       HIV screeninx in teen years, 1x in adult  "years, and at intervals if high risk    Estimated body mass index is 42.61 kg/m  as calculated from the following:    Height as of 9/25/19: 1.854 m (6' 1\").    Weight as of 9/25/19: 146.5 kg (323 lb).     Weight management plan: Discussed healthy diet and exercise guidelines    He reports that he has never smoked. He has never used smokeless tobacco.      Counseling Resources:  ATP IV Guidelines  Pooled Cohorts Equation Calculator  FRAX Risk Assessment  ICSI Preventive Guidelines  Dietary Guidelines for Americans, 2010  USDA's MyPlate  ASA Prophylaxis  Lung CA Screening    Dallin Wadsworth MD  [facility]  "

## 2021-01-28 LAB
ALBUMIN SERPL-MCNC: 4.1 G/DL (ref 3.4–5)
ALP SERPL-CCNC: 70 U/L (ref 40–150)
ALT SERPL W P-5'-P-CCNC: 51 U/L (ref 0–70)
ANION GAP SERPL CALCULATED.3IONS-SCNC: 7 MMOL/L (ref 3–14)
AST SERPL W P-5'-P-CCNC: 22 U/L (ref 0–45)
BILIRUB SERPL-MCNC: 0.4 MG/DL (ref 0.2–1.3)
BUN SERPL-MCNC: 16 MG/DL (ref 7–30)
CALCIUM SERPL-MCNC: 9.2 MG/DL (ref 8.5–10.1)
CHLORIDE SERPL-SCNC: 106 MMOL/L (ref 94–109)
CHOLEST SERPL-MCNC: 208 MG/DL
CO2 SERPL-SCNC: 25 MMOL/L (ref 20–32)
CREAT SERPL-MCNC: 0.97 MG/DL (ref 0.66–1.25)
GFR SERPL CREATININE-BSD FRML MDRD: >90 ML/MIN/{1.73_M2}
GLUCOSE SERPL-MCNC: 78 MG/DL (ref 70–99)
HDLC SERPL-MCNC: 41 MG/DL
LDLC SERPL CALC-MCNC: 148 MG/DL
NONHDLC SERPL-MCNC: 167 MG/DL
POTASSIUM SERPL-SCNC: 3.6 MMOL/L (ref 3.4–5.3)
PROT SERPL-MCNC: 7.5 G/DL (ref 6.8–8.8)
SODIUM SERPL-SCNC: 138 MMOL/L (ref 133–144)
TRIGL SERPL-MCNC: 95 MG/DL

## 2021-10-10 ENCOUNTER — HEALTH MAINTENANCE LETTER (OUTPATIENT)
Age: 31
End: 2021-10-10

## 2022-03-26 ENCOUNTER — HEALTH MAINTENANCE LETTER (OUTPATIENT)
Age: 32
End: 2022-03-26

## 2022-08-10 ASSESSMENT — ENCOUNTER SYMPTOMS
DIARRHEA: 0
NERVOUS/ANXIOUS: 0
SHORTNESS OF BREATH: 0
SORE THROAT: 0
DYSURIA: 0
HEADACHES: 0
ABDOMINAL PAIN: 0
EYE PAIN: 0
HEMATOCHEZIA: 0
COUGH: 0
PALPITATIONS: 0
HEARTBURN: 0
ARTHRALGIAS: 0
JOINT SWELLING: 0
WEAKNESS: 0
FREQUENCY: 0
MYALGIAS: 0
FEVER: 0
NAUSEA: 0
CHILLS: 0
HEMATURIA: 0
CONSTIPATION: 0
DIZZINESS: 0
PARESTHESIAS: 0

## 2022-08-11 ENCOUNTER — OFFICE VISIT (OUTPATIENT)
Dept: FAMILY MEDICINE | Facility: CLINIC | Age: 32
End: 2022-08-11
Payer: COMMERCIAL

## 2022-08-11 VITALS
TEMPERATURE: 97 F | RESPIRATION RATE: 18 BRPM | DIASTOLIC BLOOD PRESSURE: 72 MMHG | BODY MASS INDEX: 34.58 KG/M2 | OXYGEN SATURATION: 99 % | SYSTOLIC BLOOD PRESSURE: 130 MMHG | HEART RATE: 63 BPM | WEIGHT: 247 LBS | HEIGHT: 71 IN

## 2022-08-11 DIAGNOSIS — Z00.8 ENCOUNTER FOR BIOMETRIC SCREENING: ICD-10-CM

## 2022-08-11 DIAGNOSIS — Z00.00 ROUTINE GENERAL MEDICAL EXAMINATION AT A HEALTH CARE FACILITY: Primary | ICD-10-CM

## 2022-08-11 LAB
ANION GAP SERPL CALCULATED.3IONS-SCNC: 10 MMOL/L (ref 3–14)
BUN SERPL-MCNC: 18 MG/DL (ref 7–30)
CALCIUM SERPL-MCNC: 8.7 MG/DL (ref 8.5–10.1)
CHLORIDE BLD-SCNC: 107 MMOL/L (ref 94–109)
CHOLEST SERPL-MCNC: 193 MG/DL
CO2 SERPL-SCNC: 22 MMOL/L (ref 20–32)
CREAT SERPL-MCNC: 0.9 MG/DL (ref 0.66–1.25)
FASTING STATUS PATIENT QL REPORTED: YES
GFR SERPL CREATININE-BSD FRML MDRD: >90 ML/MIN/1.73M2
GLUCOSE BLD-MCNC: 87 MG/DL (ref 70–99)
HDLC SERPL-MCNC: 53 MG/DL
LDLC SERPL CALC-MCNC: 130 MG/DL
NONHDLC SERPL-MCNC: 140 MG/DL
POTASSIUM BLD-SCNC: 4 MMOL/L (ref 3.4–5.3)
SODIUM SERPL-SCNC: 139 MMOL/L (ref 133–144)
TRIGL SERPL-MCNC: 51 MG/DL

## 2022-08-11 PROCEDURE — 99395 PREV VISIT EST AGE 18-39: CPT | Performed by: FAMILY MEDICINE

## 2022-08-11 PROCEDURE — 80048 BASIC METABOLIC PNL TOTAL CA: CPT | Performed by: FAMILY MEDICINE

## 2022-08-11 PROCEDURE — 36415 COLL VENOUS BLD VENIPUNCTURE: CPT | Performed by: FAMILY MEDICINE

## 2022-08-11 PROCEDURE — 80061 LIPID PANEL: CPT | Performed by: FAMILY MEDICINE

## 2022-08-11 ASSESSMENT — ENCOUNTER SYMPTOMS
FEVER: 0
DIZZINESS: 0
ALLERGIC/IMMUNOLOGIC NEGATIVE: 1
HEADACHES: 0
JOINT SWELLING: 0
ENDOCRINE NEGATIVE: 1
ARTHRALGIAS: 0
COUGH: 0
PALPITATIONS: 0
MYALGIAS: 0
HEARTBURN: 0
SHORTNESS OF BREATH: 0
DIARRHEA: 0
DYSURIA: 0
PARESTHESIAS: 0
EYE PAIN: 0
NAUSEA: 0
CHILLS: 0
NERVOUS/ANXIOUS: 0
SORE THROAT: 0
CONSTIPATION: 0
FREQUENCY: 0
WEAKNESS: 0
HEMATOCHEZIA: 0
HEMATOLOGIC/LYMPHATIC NEGATIVE: 1
HEMATURIA: 0
ABDOMINAL PAIN: 0

## 2022-08-11 ASSESSMENT — PAIN SCALES - GENERAL: PAINLEVEL: NO PAIN (0)

## 2022-08-11 NOTE — PROGRESS NOTES
SUBJECTIVE:   CC: Temo Gallego is an 31 year old male who presents for preventative health visit.   Comes for an annual exam, biometric exam.  Currently,   doing well, no past medical history  Patient has been advised of split billing requirements and indicates understanding: Yes  Healthy Habits:     Getting at least 3 servings of Calcium per day:  Yes    Bi-annual eye exam:  NO    Dental care twice a year:  NO    Sleep apnea or symptoms of sleep apnea:  None    Diet:  Regular (no restrictions)    Frequency of exercise:  4-5 days/week    Duration of exercise:  30-45 minutes    Taking medications regularly:  Yes    Barriers to taking medications:  None    Medication side effects:  None    PHQ-2 Total Score: 0    Additional concerns today:  No        Today's PHQ-2 Score:   PHQ-2 ( 1999 Pfizer) 8/10/2022   Q1: Little interest or pleasure in doing things 0   Q2: Feeling down, depressed or hopeless 0   PHQ-2 Score 0   PHQ-2 Total Score (12-17 Years)- Positive if 3 or more points; Administer PHQ-A if positive -   Q1: Little interest or pleasure in doing things Not at all   Q2: Feeling down, depressed or hopeless Not at all   PHQ-2 Score 0       Abuse: Current or Past(Physical, Sexual or Emotional)- No  Do you feel safe in your environment? Yes    Have you ever done Advance Care Planning? (For example, a Health Directive, POLST, or a discussion with a medical provider or your loved ones about your wishes): No, advance care planning information given to patient to review.  Patient declined advance care planning discussion at this time.    Social History     Tobacco Use     Smoking status: Never Smoker     Smokeless tobacco: Never Used   Substance Use Topics     Alcohol use: Yes     Comment: 1 drink per dinner. 7 per week tops     If you drink alcohol do you typically have >3 drinks per day or >7 drinks per week? Yes      Alcohol Use 8/10/2022   Prescreen: >3 drinks/day or >7 drinks/week? Yes   Prescreen: >3 drinks/day or  >7 drinks/week? -   AUDIT SCORE  6     AUDIT - Alcohol Use Disorders Identification Test - Reproduced from the World Health Organization Audit 2001 (Second Edition) 8/10/2022   1.  How often do you have a drink containing alcohol? 4 or more times a week   2.  How many drinks containing alcohol do you have on a typical day when you are drinking? 1 or 2   3.  How often do you have five or more drinks on one occasion? Monthly   4.  How often during the last year have you found that you were not able to stop drinking once you had started? Never   5.  How often during the last year have you failed to do what was normally expected of you because of drinking? Never   6.  How often during the last year have you needed a first drink in the morning to get yourself going after a heavy drinking session? Never   7.  How often during the last year have you had a feeling of guilt or remorse after drinking? Never   8.  How often during the last year have you been unable to remember what happened the night before because of your drinking? Never   9.  Have you or someone else been injured because of your drinking? No   10. Has a relative, friend, doctor or other health care worker been concerned about your drinking or suggested you cut down? No   TOTAL SCORE 6       Last PSA: No results found for: PSA    Reviewed orders with patient. Reviewed health maintenance and updated orders accordingly - Yes  Patient Active Problem List   Diagnosis     Class 3 severe obesity due to excess calories without serious comorbidity with body mass index (BMI) of 40.0 to 44.9 in adult (H)     Body mass index (BMI) of 40.0-44.9 in adult (H)     Past Surgical History:   Procedure Laterality Date     HERNIA REPAIR  12/18/2002ish     SOFT TISSUE SURGERY  June 2009, July 2012    R shoulder and L Knee respectively       Social History     Tobacco Use     Smoking status: Never Smoker     Smokeless tobacco: Never Used   Substance Use Topics     Alcohol use: Yes      Comment: 1 drink per dinner. 7 per week tops     Family History   Problem Relation Age of Onset     Sleep Apnea Mother      Skin Cancer Father      Other Cancer Father      Breast Cancer Maternal Grandmother      Breast Cancer Paternal Grandmother      Colon Cancer Paternal Grandfather      Breast Cancer Paternal Aunt          Current Outpatient Medications   Medication Sig Dispense Refill     fexofenadine-pseudoePHEDrine (ALLEGRA-D 24) 180-240 MG 24 hr tablet Take 1 tablet by mouth daily       finasteride (PROPECIA) 1 MG tablet        ibuprofen (ADVIL/MOTRIN) 200 MG tablet Take 800 mg by mouth every 4 hours as needed for mild pain       No Known Allergies    Reviewed and updated as needed this visit by clinical staff   Tobacco  Allergies  Meds  Problems  Med Hx  Surg Hx  Fam Hx  Soc   Hx          Reviewed and updated as needed this visit by Provider      Problems              History reviewed. No pertinent past medical history.   Past Surgical History:   Procedure Laterality Date     HERNIA REPAIR  12/18/2002ish     SOFT TISSUE SURGERY  June 2009, July 2012    R shoulder and L Knee respectively     OB History   No obstetric history on file.       Review of Systems   Constitutional: Negative for chills and fever.   HENT: Negative for congestion, ear pain, hearing loss and sore throat.    Eyes: Negative for pain and visual disturbance.   Respiratory: Negative for cough and shortness of breath.    Cardiovascular: Negative for chest pain, palpitations and peripheral edema.   Gastrointestinal: Negative for abdominal pain, constipation, diarrhea, heartburn, hematochezia and nausea.   Endocrine: Negative.    Genitourinary: Negative for dysuria, frequency, genital sores, hematuria, impotence, penile discharge and urgency.   Musculoskeletal: Negative for arthralgias, joint swelling and myalgias.   Skin: Negative for rash.   Allergic/Immunologic: Negative.    Neurological: Negative for dizziness, weakness,  "headaches and paresthesias.   Hematological: Negative.    Psychiatric/Behavioral: Negative for mood changes. The patient is not nervous/anxious.      CONSTITUTIONAL: NEGATIVE for fever, chills, change in weight  INTEGUMENTARY/SKIN: NEGATIVE for worrisome rashes, moles or lesions  EYES: NEGATIVE for vision changes or irritation  ENT: NEGATIVE for ear, mouth and throat problems  RESP: NEGATIVE for significant cough or SOB  CV: NEGATIVE for chest pain, palpitations or peripheral edema  GI: NEGATIVE for nausea, abdominal pain, heartburn, or change in bowel habits   male: negative for dysuria, hematuria, decreased urinary stream, erectile dysfunction, urethral discharge  MUSCULOSKELETAL: NEGATIVE for significant arthralgias or myalgia  NEURO: NEGATIVE for weakness, dizziness or paresthesias  ENDOCRINE: NEGATIVE for temperature intolerance, skin/hair changes  HEME/ALLERGY/IMMUNE: NEGATIVE for bleeding problems  PSYCHIATRIC: NEGATIVE for changes in mood or affect    OBJECTIVE:   Pulse 63   Temp 97  F (36.1  C) (Tympanic)   Resp 18   Ht 1.797 m (5' 10.75\")   Wt 112 kg (247 lb)   SpO2 99%   BMI 34.69 kg/m      Physical Exam  GENERAL: healthy, alert and no distress  EYES: Eyes grossly normal to inspection, PERRL and conjunctivae and sclerae normal  HENT: ear canals and TM's normal, nose and mouth without ulcers or lesions  NECK: no adenopathy, no asymmetry, masses, or scars and thyroid normal to palpation  RESP: lungs clear to auscultation - no rales, rhonchi or wheezes  CV: regular rate and rhythm, normal S1 S2, no S3 or S4, no murmur, click or rub, no peripheral edema and peripheral pulses strong  ABDOMEN: soft, nontender, no hepatosplenomegaly, no masses and bowel sounds normal  MS: no gross musculoskeletal defects noted, no edema  SKIN: no suspicious lesions or rashes  NEURO: Normal strength and tone, mentation intact and speech normal  PSYCH: mentation appears normal, affect normal/bright    Diagnostic Test " "Results:  Labs reviewed in Epic  Orders Placed This Encounter   Procedures     REVIEW OF HEALTH MAINTENANCE PROTOCOL ORDERS     Lipid panel reflex to direct LDL Fasting     Basic metabolic panel  (Ca, Cl, CO2, Creat, Gluc, K, Na, BUN)       ASSESSMENT/PLAN:   (Z00.00) Routine general medical examination at a health care facility  (primary encounter diagnosis)  Comment: normal exam  Plan: Lipid panel reflex to direct LDL Fasting, Basic        metabolic panel  (Ca, Cl, CO2, Creat, Gluc, K,         Na, BUN)        Routine preventive care discussed.  Advised with diet, exercise, increase physical activity.  Declined COVID, booster today  1 year annual exam follow-up recommended    (Z68.41) Body mass index (BMI) of 40.0-44.9 in adult (H)  Comment:   Plan: He is doing well, continue to work on his weight and diet, he has lost weight since last year.             Discussed potential risks for overweight and obesity.  Such as diabetes, hypertension, sleep apnea.  Patient has been losing weight since last year.  He continued to watch his diet, continue to exercise.    (Z00.8) Encounter for biometric screening  Comment:   Plan: lipid and glucose,   Form will be filled.    Patient has been advised of split billing requirements and indicates understanding: Yes    COUNSELING:   Reviewed preventive health counseling, as reflected in patient instructions       Regular exercise       Healthy diet/nutrition       Immunizations    Declined: COVID booster due to Other: not today.            Estimated body mass index is 34.69 kg/m  as calculated from the following:    Height as of this encounter: 1.797 m (5' 10.75\").    Weight as of this encounter: 112 kg (247 lb).     Weight management plan: Discussed healthy diet and exercise guidelines    He reports that he has never smoked. He has never used smokeless tobacco.      Counseling Resources:  ATP IV Guidelines  Pooled Cohorts Equation Calculator  FRAX Risk Assessment  ICSI Preventive " Guidelines  Dietary Guidelines for Americans, 2010  USDA's MyPlate  ASA Prophylaxis  Lung CA Screening    Dallin Wadsworth MD  [facility]

## 2022-08-15 ENCOUNTER — TELEPHONE (OUTPATIENT)
Dept: FAMILY MEDICINE | Facility: CLINIC | Age: 32
End: 2022-08-15

## 2022-08-15 NOTE — TELEPHONE ENCOUNTER
Pt brought form during exam.  MT filled out form and writer faxed form to [phone]    Stat scanned form    Tere Gtz-  Roscoe Costa

## 2022-09-18 ENCOUNTER — HEALTH MAINTENANCE LETTER (OUTPATIENT)
Age: 32
End: 2022-09-18

## 2023-08-24 ENCOUNTER — OFFICE VISIT (OUTPATIENT)
Dept: FAMILY MEDICINE | Facility: CLINIC | Age: 33
End: 2023-08-24
Payer: COMMERCIAL

## 2023-08-24 VITALS
RESPIRATION RATE: 18 BRPM | BODY MASS INDEX: 35.56 KG/M2 | DIASTOLIC BLOOD PRESSURE: 72 MMHG | HEIGHT: 71 IN | SYSTOLIC BLOOD PRESSURE: 122 MMHG | WEIGHT: 254 LBS | TEMPERATURE: 98.1 F | OXYGEN SATURATION: 99 % | HEART RATE: 62 BPM

## 2023-08-24 DIAGNOSIS — Z13.220 LIPID SCREENING: ICD-10-CM

## 2023-08-24 DIAGNOSIS — Z13.0 SCREENING FOR BLOOD DISEASE: ICD-10-CM

## 2023-08-24 DIAGNOSIS — Z13.1 SCREENING FOR DIABETES MELLITUS: ICD-10-CM

## 2023-08-24 DIAGNOSIS — E66.812 CLASS 2 OBESITY DUE TO EXCESS CALORIES WITHOUT SERIOUS COMORBIDITY WITH BODY MASS INDEX (BMI) OF 35.0 TO 35.9 IN ADULT: ICD-10-CM

## 2023-08-24 DIAGNOSIS — Z00.00 ROUTINE GENERAL MEDICAL EXAMINATION AT A HEALTH CARE FACILITY: Primary | ICD-10-CM

## 2023-08-24 DIAGNOSIS — E66.09 CLASS 2 OBESITY DUE TO EXCESS CALORIES WITHOUT SERIOUS COMORBIDITY WITH BODY MASS INDEX (BMI) OF 35.0 TO 35.9 IN ADULT: ICD-10-CM

## 2023-08-24 PROBLEM — E66.01 CLASS 3 SEVERE OBESITY DUE TO EXCESS CALORIES WITHOUT SERIOUS COMORBIDITY WITH BODY MASS INDEX (BMI) OF 40.0 TO 44.9 IN ADULT (H): Status: RESOLVED | Noted: 2018-11-06 | Resolved: 2023-08-24

## 2023-08-24 PROBLEM — E66.813 CLASS 3 SEVERE OBESITY DUE TO EXCESS CALORIES WITHOUT SERIOUS COMORBIDITY WITH BODY MASS INDEX (BMI) OF 40.0 TO 44.9 IN ADULT (H): Status: RESOLVED | Noted: 2018-11-06 | Resolved: 2023-08-24

## 2023-08-24 LAB
CHOLEST SERPL-MCNC: 199 MG/DL
CREAT SERPL-MCNC: 1.11 MG/DL (ref 0.67–1.17)
FASTING STATUS PATIENT QL REPORTED: YES
GFR SERPL CREATININE-BSD FRML MDRD: 90 ML/MIN/1.73M2
GLUCOSE SERPL-MCNC: 92 MG/DL (ref 70–99)
HBA1C MFR BLD: 5 % (ref 0–5.6)
HDLC SERPL-MCNC: 44 MG/DL
LDLC SERPL CALC-MCNC: 136 MG/DL
NONHDLC SERPL-MCNC: 155 MG/DL
TRIGL SERPL-MCNC: 97 MG/DL

## 2023-08-24 PROCEDURE — 36415 COLL VENOUS BLD VENIPUNCTURE: CPT | Performed by: NURSE PRACTITIONER

## 2023-08-24 PROCEDURE — 82947 ASSAY GLUCOSE BLOOD QUANT: CPT | Performed by: NURSE PRACTITIONER

## 2023-08-24 PROCEDURE — 82565 ASSAY OF CREATININE: CPT | Performed by: NURSE PRACTITIONER

## 2023-08-24 PROCEDURE — 99395 PREV VISIT EST AGE 18-39: CPT | Performed by: NURSE PRACTITIONER

## 2023-08-24 PROCEDURE — 83036 HEMOGLOBIN GLYCOSYLATED A1C: CPT | Performed by: NURSE PRACTITIONER

## 2023-08-24 PROCEDURE — 80061 LIPID PANEL: CPT | Performed by: NURSE PRACTITIONER

## 2023-08-24 RX ORDER — ACETAMINOPHEN 325 MG/1
325 TABLET ORAL EVERY 4 HOURS PRN
COMMUNITY

## 2023-08-24 ASSESSMENT — ENCOUNTER SYMPTOMS
NAUSEA: 0
PALPITATIONS: 0
DYSURIA: 0
EYE PAIN: 0
NERVOUS/ANXIOUS: 0
CONSTIPATION: 0
ABDOMINAL PAIN: 0
ARTHRALGIAS: 0
HEADACHES: 0
SHORTNESS OF BREATH: 0
CHILLS: 0
FEVER: 0
HEMATOCHEZIA: 0
HEMATURIA: 0
MYALGIAS: 0
JOINT SWELLING: 0
FREQUENCY: 0
HEARTBURN: 0
DIZZINESS: 0
WEAKNESS: 0
SORE THROAT: 0
COUGH: 0
DIARRHEA: 0
PARESTHESIAS: 0

## 2023-08-24 ASSESSMENT — PAIN SCALES - GENERAL: PAINLEVEL: NO PAIN (0)

## 2023-08-24 NOTE — PROGRESS NOTES
SUBJECTIVE:   CC: Temo Gallego is an 32 year old who presents for preventative health visit.       8/24/2023     9:13 AM   Additional Questions   Roomed by Tere ROWAN CMA       Healthy Habits:     Getting at least 3 servings of Calcium per day:  Yes    Bi-annual eye exam:  NO    Dental care twice a year:  NO    Sleep apnea or symptoms of sleep apnea:  None    Diet:  Regular (no restrictions)    Frequency of exercise:  4-5 days/week    Duration of exercise:  30-45 minutes    Taking medications regularly:  Yes    Medication side effects:  None    Additional concerns today:  No      Today's PHQ-2 Score:       8/24/2023     1:34 AM   PHQ-2 ( 1999 Pfizer)   Q1: Little interest or pleasure in doing things 0   Q2: Feeling down, depressed or hopeless 0   PHQ-2 Score 0   Q1: Little interest or pleasure in doing things Not at all   Q2: Feeling down, depressed or hopeless Not at all   PHQ-2 Score 0       Social History     Tobacco Use    Smoking status: Never     Passive exposure: Never    Smokeless tobacco: Never   Substance Use Topics    Alcohol use: Yes     Comment: Very occasionally             8/24/2023     1:34 AM   Alcohol Use   Prescreen: >3 drinks/day or >7 drinks/week? No       Last PSA: No results found for: PSA    Reviewed orders with patient. Reviewed health maintenance and updated orders accordingly - Yes  Lab work is in process    Reviewed and updated as needed this visit by clinical staff   Tobacco  Allergies  Meds              Reviewed and updated as needed this visit by Provider                 History reviewed. No pertinent past medical history.     Review of Systems   Constitutional:  Negative for chills and fever.   HENT:  Negative for congestion, ear pain, hearing loss and sore throat.    Eyes:  Negative for pain and visual disturbance.   Respiratory:  Negative for cough and shortness of breath.    Cardiovascular:  Negative for chest pain, palpitations and peripheral edema.   Gastrointestinal:  Negative  "for abdominal pain, constipation, diarrhea, heartburn, hematochezia and nausea.   Genitourinary:  Negative for dysuria, frequency, genital sores, hematuria, impotence, penile discharge and urgency.   Musculoskeletal:  Negative for arthralgias, joint swelling and myalgias.   Skin:  Negative for rash.   Neurological:  Negative for dizziness, weakness, headaches and paresthesias.   Psychiatric/Behavioral:  Negative for mood changes. The patient is not nervous/anxious.      Overall feeling well.  Working on weight loss.      OBJECTIVE:   /72 (BP Location: Left arm, Patient Position: Sitting, Cuff Size: Adult Large)   Pulse 62   Temp 98.1  F (36.7  C) (Oral)   Resp 18   Ht 1.791 m (5' 10.5\")   Wt 115.2 kg (254 lb)   SpO2 99%   BMI 35.93 kg/m      Physical Exam  GENERAL: healthy, alert and no distress  EYES: Eyes grossly normal to inspection, PERRL and conjunctivae and sclerae normal  HENT: ear canals and TM's normal, nose and mouth without ulcers or lesions  NECK: no adenopathy, no asymmetry, masses, or scars and thyroid normal to palpation  RESP: lungs clear to auscultation - no rales, rhonchi or wheezes  CV: regular rate and rhythm, normal S1 S2, no S3 or S4, no murmur, click or rub, no peripheral edema and peripheral pulses strong  ABDOMEN: soft, nontender, no hepatosplenomegaly, no masses and bowel sounds normal  MS: no gross musculoskeletal defects noted, no edema  SKIN: no suspicious lesions or rashes  NEURO: Normal strength and tone, mentation intact and speech normal  PSYCH: mentation appears normal, affect normal/bright    Diagnostic Test Results:  Labs reviewed in Epic    ASSESSMENT/PLAN:       ICD-10-CM    1. Routine general medical examination at a health care facility  Z00.00       2. Class 2 obesity due to excess calories without serious comorbidity with body mass index (BMI) of 35.0 to 35.9 in adult  E66.09     Z68.35       3. Screening for diabetes mellitus  Z13.1 Glucose     Hemoglobin A1c " "    Glucose     Hemoglobin A1c      4. Lipid screening  Z13.220 Lipid panel reflex to direct LDL Fasting     Lipid panel reflex to direct LDL Fasting      5. Screening for blood disease  Z13.0 Creatinine     Creatinine          Patient has been advised of split billing requirements and indicates understanding: Yes      COUNSELING:   Reviewed preventive health counseling, as reflected in patient instructions       Healthy diet/nutrition      BMI:   Estimated body mass index is 35.93 kg/m  as calculated from the following:    Height as of this encounter: 1.791 m (5' 10.5\").    Weight as of this encounter: 115.2 kg (254 lb).   Weight management plan: Discussed healthy diet and exercise guidelines      He reports that he has never smoked. He has never been exposed to tobacco smoke. He has never used smokeless tobacco.            Allison Almanzar CNP  [facility]  "

## 2024-08-01 ENCOUNTER — OFFICE VISIT (OUTPATIENT)
Dept: FAMILY MEDICINE | Facility: CLINIC | Age: 34
End: 2024-08-01
Payer: COMMERCIAL

## 2024-08-01 VITALS
RESPIRATION RATE: 16 BRPM | HEIGHT: 71 IN | WEIGHT: 257.6 LBS | HEART RATE: 81 BPM | TEMPERATURE: 98.3 F | SYSTOLIC BLOOD PRESSURE: 117 MMHG | DIASTOLIC BLOOD PRESSURE: 80 MMHG | OXYGEN SATURATION: 100 % | BODY MASS INDEX: 36.06 KG/M2

## 2024-08-01 DIAGNOSIS — Z87.898 HISTORY OF WEIGHT LOSS: ICD-10-CM

## 2024-08-01 DIAGNOSIS — Z13.220 LIPID SCREENING: ICD-10-CM

## 2024-08-01 DIAGNOSIS — Z00.00 ROUTINE GENERAL MEDICAL EXAMINATION AT A HEALTH CARE FACILITY: Primary | ICD-10-CM

## 2024-08-01 DIAGNOSIS — Z00.8 ENCOUNTER FOR BIOMETRIC SCREENING: ICD-10-CM

## 2024-08-01 DIAGNOSIS — L98.7 EXCESS SKIN: ICD-10-CM

## 2024-08-01 DIAGNOSIS — Z13.1 SCREENING FOR DIABETES MELLITUS: ICD-10-CM

## 2024-08-01 LAB
ALBUMIN SERPL BCG-MCNC: 4.3 G/DL (ref 3.5–5.2)
ALP SERPL-CCNC: 45 U/L (ref 40–150)
ALT SERPL W P-5'-P-CCNC: 27 U/L (ref 0–70)
ANION GAP SERPL CALCULATED.3IONS-SCNC: 8 MMOL/L (ref 7–15)
AST SERPL W P-5'-P-CCNC: 20 U/L (ref 0–45)
BASOPHILS # BLD AUTO: 0 10E3/UL (ref 0–0.2)
BASOPHILS NFR BLD AUTO: 1 %
BILIRUB SERPL-MCNC: 0.3 MG/DL
BUN SERPL-MCNC: 14.9 MG/DL (ref 6–20)
CALCIUM SERPL-MCNC: 8.5 MG/DL (ref 8.8–10.4)
CHLORIDE SERPL-SCNC: 105 MMOL/L (ref 98–107)
CHOLEST SERPL-MCNC: 193 MG/DL
CREAT SERPL-MCNC: 0.93 MG/DL (ref 0.67–1.17)
EGFRCR SERPLBLD CKD-EPI 2021: >90 ML/MIN/1.73M2
EOSINOPHIL # BLD AUTO: 0.1 10E3/UL (ref 0–0.7)
EOSINOPHIL NFR BLD AUTO: 1 %
ERYTHROCYTE [DISTWIDTH] IN BLOOD BY AUTOMATED COUNT: 11.6 % (ref 10–15)
FASTING STATUS PATIENT QL REPORTED: YES
FASTING STATUS PATIENT QL REPORTED: YES
GLUCOSE SERPL-MCNC: 90 MG/DL (ref 70–99)
HBA1C MFR BLD: 5.1 % (ref 0–5.6)
HCO3 SERPL-SCNC: 27 MMOL/L (ref 22–29)
HCT VFR BLD AUTO: 47.2 % (ref 40–53)
HDLC SERPL-MCNC: 47 MG/DL
HGB BLD-MCNC: 15.5 G/DL (ref 13.3–17.7)
IMM GRANULOCYTES # BLD: 0 10E3/UL
IMM GRANULOCYTES NFR BLD: 0 %
LDLC SERPL CALC-MCNC: 122 MG/DL
LYMPHOCYTES # BLD AUTO: 2.8 10E3/UL (ref 0.8–5.3)
LYMPHOCYTES NFR BLD AUTO: 36 %
MCH RBC QN AUTO: 29.8 PG (ref 26.5–33)
MCHC RBC AUTO-ENTMCNC: 32.8 G/DL (ref 31.5–36.5)
MCV RBC AUTO: 91 FL (ref 78–100)
MONOCYTES # BLD AUTO: 0.7 10E3/UL (ref 0–1.3)
MONOCYTES NFR BLD AUTO: 9 %
NEUTROPHILS # BLD AUTO: 4.2 10E3/UL (ref 1.6–8.3)
NEUTROPHILS NFR BLD AUTO: 54 %
NONHDLC SERPL-MCNC: 146 MG/DL
PLATELET # BLD AUTO: 250 10E3/UL (ref 150–450)
POTASSIUM SERPL-SCNC: 4.5 MMOL/L (ref 3.4–5.3)
PROT SERPL-MCNC: 6 G/DL (ref 6.4–8.3)
RBC # BLD AUTO: 5.21 10E6/UL (ref 4.4–5.9)
SODIUM SERPL-SCNC: 140 MMOL/L (ref 135–145)
TRIGL SERPL-MCNC: 120 MG/DL
WBC # BLD AUTO: 7.8 10E3/UL (ref 4–11)

## 2024-08-01 PROCEDURE — 80061 LIPID PANEL: CPT | Performed by: PHYSICIAN ASSISTANT

## 2024-08-01 PROCEDURE — 80053 COMPREHEN METABOLIC PANEL: CPT | Performed by: PHYSICIAN ASSISTANT

## 2024-08-01 PROCEDURE — 85025 COMPLETE CBC W/AUTO DIFF WBC: CPT | Performed by: PHYSICIAN ASSISTANT

## 2024-08-01 PROCEDURE — 99395 PREV VISIT EST AGE 18-39: CPT | Performed by: PHYSICIAN ASSISTANT

## 2024-08-01 PROCEDURE — 36415 COLL VENOUS BLD VENIPUNCTURE: CPT | Performed by: PHYSICIAN ASSISTANT

## 2024-08-01 PROCEDURE — 83036 HEMOGLOBIN GLYCOSYLATED A1C: CPT | Performed by: PHYSICIAN ASSISTANT

## 2024-08-01 SDOH — HEALTH STABILITY: PHYSICAL HEALTH: ON AVERAGE, HOW MANY DAYS PER WEEK DO YOU ENGAGE IN MODERATE TO STRENUOUS EXERCISE (LIKE A BRISK WALK)?: 7 DAYS

## 2024-08-01 SDOH — HEALTH STABILITY: PHYSICAL HEALTH: ON AVERAGE, HOW MANY MINUTES DO YOU ENGAGE IN EXERCISE AT THIS LEVEL?: 40 MIN

## 2024-08-01 ASSESSMENT — SOCIAL DETERMINANTS OF HEALTH (SDOH): HOW OFTEN DO YOU GET TOGETHER WITH FRIENDS OR RELATIVES?: THREE TIMES A WEEK

## 2024-08-01 ASSESSMENT — PAIN SCALES - GENERAL: PAINLEVEL: NO PAIN (0)

## 2024-08-01 NOTE — PATIENT INSTRUCTIONS
Patient Education   Preventive Care Advice   This is general advice given by our system to help you stay healthy. However, your care team may have specific advice just for you. Please talk to your care team about your preventive care needs.  Nutrition  Eat 5 or more servings of fruits and vegetables each day.  Try wheat bread, brown rice and whole grain pasta (instead of white bread, rice, and pasta).  Get enough calcium and vitamin D. Check the label on foods and aim for 100% of the RDA (recommended daily allowance).  Lifestyle  Exercise at least 150 minutes each week  (30 minutes a day, 5 days a week).  Do muscle strengthening activities 2 days a week. These help control your weight and prevent disease.  No smoking.  Wear sunscreen to prevent skin cancer.  Have a dental exam and cleaning every 6 months.  Yearly exams  See your health care team every year to talk about:  Any changes in your health.  Any medicines your care team has prescribed.  Preventive care, family planning, and ways to prevent chronic diseases.  Shots (vaccines)   HPV shots (up to age 26), if you've never had them before.  Hepatitis B shots (up to age 59), if you've never had them before.  COVID-19 shot: Get this shot when it's due.  Flu shot: Get a flu shot every year.  Tetanus shot: Get a tetanus shot every 10 years.  Pneumococcal, hepatitis A, and RSV shots: Ask your care team if you need these based on your risk.  Shingles shot (for age 50 and up)  General health tests  Diabetes screening:  Starting at age 35, Get screened for diabetes at least every 3 years.  If you are younger than age 35, ask your care team if you should be screened for diabetes.  Cholesterol test: At age 39, start having a cholesterol test every 5 years, or more often if advised.  Bone density scan (DEXA): At age 50, ask your care team if you should have this scan for osteoporosis (brittle bones).  Hepatitis C: Get tested at least once in your life.  STIs (sexually  transmitted infections)  Before age 24: Ask your care team if you should be screened for STIs.  After age 24: Get screened for STIs if you're at risk. You are at risk for STIs (including HIV) if:  You are sexually active with more than one person.  You don't use condoms every time.  You or a partner was diagnosed with a sexually transmitted infection.  If you are at risk for HIV, ask about PrEP medicine to prevent HIV.  Get tested for HIV at least once in your life, whether you are at risk for HIV or not.  Cancer screening tests  Cervical cancer screening: If you have a cervix, begin getting regular cervical cancer screening tests starting at age 21.  Breast cancer scan (mammogram): If you've ever had breasts, begin having regular mammograms starting at age 40. This is a scan to check for breast cancer.  Colon cancer screening: It is important to start screening for colon cancer at age 45.  Have a colonoscopy test every 10 years (or more often if you're at risk) Or, ask your provider about stool tests like a FIT test every year or Cologuard test every 3 years.  To learn more about your testing options, visit:   .  For help making a decision, visit:   https://bit.ly/xm62599.  Prostate cancer screening test: If you have a prostate, ask your care team if a prostate cancer screening test (PSA) at age 55 is right for you.  Lung cancer screening: If you are a current or former smoker ages 50 to 80, ask your care team if ongoing lung cancer screenings are right for you.  For informational purposes only. Not to replace the advice of your health care provider. Copyright   2023 [employer]. All rights reserved. Clinically reviewed by the [facility]. [employer] 668242 - REV 01/24.

## 2024-08-01 NOTE — PROGRESS NOTES
"Preventive Care Visit  [facility]  Adebayo Mares PA-C, Physician Assistant - Medical  Aug 1, 2024      Assessment & Plan     (Z00.00) Routine general medical examination at a health care facility  (primary encounter diagnosis)  Comment: Patient presents for routine physical.  Plan: Comprehensive metabolic panel (BMP + Alb, Alk         Phos, ALT, AST, Total. Bili, TP), CBC with         platelets and differential           (Z00.8) Encounter for biometric screening  Comment: Biometric form needs completion of laboratory values and to be faxed off    (Z87.898) History of weight loss  Comment: Patient reports history of weight loss as he was once over 325 pounds.  Since weight loss has had some excessive/redundant skin.  Would like to talk with surgeon on possible correction.  Referral placed.  Plan: Adult Plastic Surgery  Referral          (L98.7) Excess skin  Comment:   Plan: Adult Plastic Surgery  Referral            (Z13.220) Lipid screening  Comment: Fasting today discussed lipid screening.  Plan: Lipid panel reflex to direct LDL Fasting           (Z13.1) Screening for diabetes mellitus  Comment: Discussed screening diabetes.  Plan: Hemoglobin A1c          BMI  Estimated body mass index is 35.93 kg/m  as calculated from the following:    Height as of this encounter: 1.803 m (5' 11\").    Weight as of this encounter: 116.8 kg (257 lb 9.6 oz).   Weight management plan: Discussed healthy diet and exercise guidelines    Counseling  Appropriate preventive services were addressed with this patient via screening, questionnaire, or discussion as appropriate for fall prevention, nutrition, physical activity, Tobacco-use cessation, weight loss and cognition.  Checklist reviewing preventive services available has been given to the patient.  Reviewed patient's diet, addressing concerns and/or questions.   The patient was instructed to see the dentist every 6 months.     Subjective "   Temo is a 33 year old, presenting for the following:  Physical        8/1/2024     8:07 AM   Additional Questions   Roomed by Linda Bee   Accompanied by SELF        Health Care Directive  Patient does not have a Health Care Directive or Living Will: Discussed advance care planning with patient; however, patient declined at this time.    HPI  Has had weight loss from 325. 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     Not a smoker.     Alcohol - one beer with dinner.     Weight lifting, bikes to work one mile.     Caffeine - not regular, occasional     No family history of prostate cancer.     Paternal grandfather with colon cancer.     No excessive daytime sleepiness.     No sleep concerns.     Dentist - only when issues.     Eye exam last 2-3 years ago.     No bloody stool     No chest pain issues         8/1/2024   General Health   How would you rate your overall physical health? Good   Feel stress (tense, anxious, or unable to sleep) Not at all            8/1/2024   Nutrition   Three or more servings of calcium each day? Yes   Diet: Regular (no restrictions)    Breakfast skipped   How many servings of fruit and vegetables per day? (!) 2-3   How many sweetened beverages each day? 0-1       Multiple values from one day are sorted in reverse-chronological order         8/1/2024   Exercise   Days per week of moderate/strenous exercise 7 days   Average minutes spent exercising at this level 40 min          8/1/2024   Social Factors   Frequency of gathering with friends or relatives Three times a week   Worry food won't last until get money to buy more No   Food not last or not have enough money for food? No   Do you have housing? (Housing is defined as stable permanent housing and does not include staying ouside in a car, in a tent, in an abandoned building, in an overnight shelter, or couch-surfing.) Yes    Are you worried about losing your housing? No   Lack of transportation? No  "  Unable to get utilities (heat,electricity)? No   Want help with housing or utility concern? No          8/1/2024   Dental   Dentist two times every year? (!) NO          8/1/2024   TB Screening   Were you born outside of the [place]? Yes      Today's PHQ-2 Score:       8/1/2024     7:24 AM   PHQ-2 ( 1999 [employer])   Q1: Little interest or pleasure in doing things 0   Q2: Feeling down, depressed or hopeless 0   PHQ-2 Score 0   Q1: Little interest or pleasure in doing things Not at all   Q2: Feeling down, depressed or hopeless Not at all   PHQ-2 Score 0           8/1/2024   Substance Use   Alcohol more than 3/day or more than 7/wk No   Do you use any other substances recreationally? No        Social History     Tobacco Use    Smoking status: Never     Passive exposure: Never    Smokeless tobacco: Never   Vaping Use    Vaping status: Never Used   Substance Use Topics    Alcohol use: Yes     Comment: Very occasionally    Drug use: No           8/1/2024   STI Screening   New sexual partner(s) since last STI/HIV test? No          8/1/2024   Contraception/Family Planning   Questions about contraception or family planning No      Reviewed and updated as needed this visit by Provider                    History reviewed. No pertinent past medical history.  Past Surgical History:   Procedure Laterality Date    HERNIA REPAIR  12/18/2002ish    SOFT TISSUE SURGERY  June 2009, July 2012    R shoulder and L Knee respectively         Review of Systems  Constitutional, neuro, ENT, endocrine, pulmonary, cardiac, gastrointestinal, genitourinary, musculoskeletal, integument and psychiatric systems are negative, except as otherwise noted.     Objective    Exam  /80   Pulse 81   Temp 98.3  F (36.8  C) (Tympanic)   Resp 16   Ht 1.803 m (5' 11\")   Wt 116.8 kg (257 lb 9.6 oz)   SpO2 100%   BMI 35.93 kg/m     Estimated body mass index is 35.93 kg/m  as calculated from the following:    Height as of this encounter: 1.803 m (5' 11\").    " Weight as of this encounter: 116.8 kg (257 lb 9.6 oz).    Physical Exam  GENERAL: alert, no distress, and over weight  EYES: Eyes grossly normal to inspection, PERRL and conjunctivae and sclerae normal  HENT: ear canals and TM's normal, nose and mouth without ulcers or lesions  NECK: no adenopathy, no asymmetry, masses, or scars  RESP: lungs clear to auscultation - no rales, rhonchi or wheezes  CV: regular rate and rhythm, normal S1 S2, no S3 or S4, no murmur, click or rub, no peripheral edema  ABDOMEN: soft, nontender, no hepatosplenomegaly, no masses and bowel sounds normal  MS: no gross musculoskeletal defects noted, no edema  SKIN: no suspicious lesions or rashes  NEURO: Normal strength and tone, mentation intact and speech normal  PSYCH: mentation appears normal, affect normal/bright    Signed Electronically by: Adebayo Mares PA-C

## 2024-08-02 ENCOUNTER — OFFICE VISIT (OUTPATIENT)
Dept: SURGERY | Facility: CLINIC | Age: 34
End: 2024-08-02
Attending: PHYSICIAN ASSISTANT
Payer: COMMERCIAL

## 2024-08-02 ENCOUNTER — TELEPHONE (OUTPATIENT)
Dept: FAMILY MEDICINE | Facility: CLINIC | Age: 34
End: 2024-08-02

## 2024-08-02 VITALS — HEIGHT: 71 IN | BODY MASS INDEX: 35.63 KG/M2 | WEIGHT: 254.5 LBS

## 2024-08-02 DIAGNOSIS — Z87.898 HISTORY OF WEIGHT LOSS: ICD-10-CM

## 2024-08-02 DIAGNOSIS — L98.7 EXCESS SKIN: ICD-10-CM

## 2024-08-02 PROCEDURE — 99205 OFFICE O/P NEW HI 60 MIN: CPT | Performed by: PLASTIC SURGERY

## 2024-08-02 NOTE — TELEPHONE ENCOUNTER
Completed form was faxed to [employer] @ [phone]. Sent a copy to be scanned into the chart and placed a copy in the TC form folder.Anitra Wiggins [facility]

## 2024-08-02 NOTE — PROGRESS NOTES
"Referring Provider:  Adebayo Mares PA-C  [address] [place] [address]     Primary Care Provider:  [facility]      RE: Temo Gallego.  : 1990.  ERIN: 2024.    Reason for visit: Chest and abdominal contouring surgery    HPI: The patient has undergone a major transformation through diet and exercise and has lost almost 100 pounds over the last few years.  He has been stable for some time.  He is interested in undergoing contouring surgery to improve his abdomen and his chest wall.  He is unhappy with the aesthetics of both.    Medical history:  None    Surgical history:  Past Surgical History:   Procedure Laterality Date    HERNIA REPAIR  2002ish    SOFT TISSUE SURGERY  2009, 2012    R shoulder and L Knee respectively       Family history:  Family History   Problem Relation Age of Onset    Sleep Apnea Mother     Skin Cancer Father     Other Cancer Father     Breast Cancer Maternal Grandmother     Breast Cancer Paternal Grandmother     Colon Cancer Paternal Grandfather     Breast Cancer Paternal Aunt        Medications:  Current Outpatient Medications   Medication Sig Dispense Refill    acetaminophen (TYLENOL) 325 MG tablet Take 325 mg by mouth every 4 hours as needed (Patient not taking: Reported on 2024)      fexofenadine-pseudoePHEDrine (ALLEGRA-D 24) 180-240 MG 24 hr tablet Take 1 tablet by mouth daily (Patient not taking: Reported on 2024)      ibuprofen (ADVIL/MOTRIN) 200 MG tablet Take 800 mg by mouth every 4 hours as needed for mild pain (Patient not taking: Reported on 2024)         Allergies:  No Known Allergies    Social history:   Social History     Tobacco Use    Smoking status: Never     Passive exposure: Never    Smokeless tobacco: Never   Substance Use Topics    Alcohol use: Yes     Comment: Very occasionally         Physical Examination:  Ht 1.803 m (5' 11\")   Wt 115.4 kg (254 lb 8 oz)   BMI 35.50 kg/m    Body mass " index is 35.5 kg/m .    General: No acute distress.    ABDOMEN: Moderately large abdominal panniculus from the mid abdomen down to the lower abdomen.  The umbilicus is above the panniculus.  The panniculus does not hang to the groins.  He has a rotund upper abdomen without excess skin.  The rolls of his abdomen go towards the back.    Chest: He has redundant skin and subcutaneous tissues of the chest with grade 3 ptosis nipples.  The overhang goes to the lateral chest and back.        ASSESMENT and PLAN:    Based on above findings, a diagnosis of massive weight loss with symptomatic excess skin of the abdomen.  I had a anika, detailed discussion with the patient in the presence of my nurse (who was present from beginning to end) about the proposed surgery. I was very clear and detailed about overview of surgery, perioperative plans, and expectations. I showed the patient where the scars would be on the patient's body and with pictures, and what the concepts of the procedure. The major differences of the different types of abdominal contouring procedure (panniculectomy and Abdominoplasty) were explained in detail including the scars, umbilical position/presence/relocation/loss, area of the abdomen affected, and aesthetic outcome.  All risks, benefits and alternatives of the surgery including but not limited to pain, infection, bleeding, scarring, asymmetry, seromas, hematomas, wound breakdown, wound dehiscence, numbness, nerve and vessel injury, prominent scar, hypertrophic scar, keloid scar, inability to remove all of the excess tissue, reloosening over time, injury to deeper structures, swelling of the mons, DVT, PE, MI, CVA, pneumonia, renal failure and death. The patient agreed that they understood them all and had all their questions answered to their satisfaction.  His particular case I think the best course of action is a panniculectomy with umbilical transposition.  Showed him where the scar would be and what  to expect from the surgery.  We will send the quotes for the procedure(s) if not covered if they want as discussed.  We will get prior authorization for the procedure(s) and then proceed as indicated.      With regards to his chest wall, from a technical standpoint, options include doing nothing versus  mastectomy and possible free nipple graft tattoo.  I had a anika, detailed discussion with the patient in the presence of my nurse (who was present from beginning to end) about chest surgery and the proposed surgery. I was very clear and detailed about overview of surgery, perioperative plans, and expectations.  The excision would go from the front of the chest around the lateral aspect but could not go to the back.  We may use a drain.  Consent obtained.  Photographs obtained.  We will try to get prior authorization.  We will also give him quotes for the procedure if denied by his insurance.  All risks, benefits and alternatives of the procedure including pain, infection, bleeding, scarring, asymmetry, seromas, hematomas, wound breakdown, wound dehiscence, indentation of the area, contour irregularities, recurrence, requirement of further surgeries depending on pathology, numbness of the nipple and areolar areas, DVT, PE, MI, CVA, pneumonia and death were explained.      Quotes for both procedures will be given to the patient and then he will proceed as indicated.  Will let me know if he wants to proceed.      All questions were answered. The patient was happy with the visit. I look forward to helping the patient out in the near future as indicated.       Total time spent in the encounter today including chart review, visit itself, and post-visit paperwork was 60 minutes.       Juan Antonio Hernandez MD    Chief, Division of Plastic Surgery  Department of Surgery  [facility]      CC: Adebayo Mares PA-C  [address] [place] [address]  CC: [facility]  [place]

## 2024-08-02 NOTE — NURSING NOTE
"Temo Gallego's goals for this visit include:   Chief Complaint   Patient presents with    Consult     History of weight loss [Z87.898] Excess skin [L98.7] No outside records          He requests these members of his care team be copied on today's visit information:     PCP: [facility]    Referring Provider:  Adebayo Mares PA-C  [address] [place] [address]    Ht 1.803 m (5' 11\")   Wt 115.4 kg (254 lb 8 oz)   BMI 35.50 kg/m      Do you need any medication refills at today's visit?     Mely Michel MA on 8/2/2024 at 3:17 PM      "

## 2024-08-02 NOTE — LETTER
2024      Temo Gallego  [address]  [place] [address]      Dear Colleague,    Thank you for referring your patient, Temo Gallego, to the [facility]. Please see a copy of my visit note below.    Referring Provider:  Adebayo Mares PA-C  [address] [place] [address]     Primary Care Provider:  Clinic - [facility]      RE: Temo Gallego.  : 1990.  ERIN: 2024.    Reason for visit: Chest and abdominal contouring surgery    HPI: The patient has undergone a major transformation through diet and exercise and has lost almost 100 pounds over the last few years.  He has been stable for some time.  He is interested in undergoing contouring surgery to improve his abdomen and his chest wall.  He is unhappy with the aesthetics of both.    Medical history:  None    Surgical history:  Past Surgical History:   Procedure Laterality Date     HERNIA REPAIR  2002ish     SOFT TISSUE SURGERY  2009, 2012    R shoulder and L Knee respectively       Family history:  Family History   Problem Relation Age of Onset     Sleep Apnea Mother      Skin Cancer Father      Other Cancer Father      Breast Cancer Maternal Grandmother      Breast Cancer Paternal Grandmother      Colon Cancer Paternal Grandfather      Breast Cancer Paternal Aunt        Medications:  Current Outpatient Medications   Medication Sig Dispense Refill     acetaminophen (TYLENOL) 325 MG tablet Take 325 mg by mouth every 4 hours as needed (Patient not taking: Reported on 2024)       fexofenadine-pseudoePHEDrine (ALLEGRA-D 24) 180-240 MG 24 hr tablet Take 1 tablet by mouth daily (Patient not taking: Reported on 2024)       ibuprofen (ADVIL/MOTRIN) 200 MG tablet Take 800 mg by mouth every 4 hours as needed for mild pain (Patient not taking: Reported on 2024)         Allergies:  No Known Allergies    Social history:   Social History     Tobacco Use      "Smoking status: Never     Passive exposure: Never     Smokeless tobacco: Never   Substance Use Topics     Alcohol use: Yes     Comment: Very occasionally         Physical Examination:  Ht 1.803 m (5' 11\")   Wt 115.4 kg (254 lb 8 oz)   BMI 35.50 kg/m    Body mass index is 35.5 kg/m .    General: No acute distress.    ABDOMEN: Moderately large abdominal panniculus from the mid abdomen down to the lower abdomen.  The umbilicus is above the panniculus.  The panniculus does not hang to the groins.  He has a rotund upper abdomen without excess skin.  The rolls of his abdomen go towards the back.    Chest: He has redundant skin and subcutaneous tissues of the chest with grade 3 ptosis nipples.  The overhang goes to the lateral chest and back.        ASSESMENT and PLAN:    Based on above findings, a diagnosis of massive weight loss with symptomatic excess skin of the abdomen.  I had a anika, detailed discussion with the patient in the presence of my nurse (who was present from beginning to end) about the proposed surgery. I was very clear and detailed about overview of surgery, perioperative plans, and expectations. I showed the patient where the scars would be on the patient's body and with pictures, and what the concepts of the procedure. The major differences of the different types of abdominal contouring procedure (panniculectomy and Abdominoplasty) were explained in detail including the scars, umbilical position/presence/relocation/loss, area of the abdomen affected, and aesthetic outcome.  All risks, benefits and alternatives of the surgery including but not limited to pain, infection, bleeding, scarring, asymmetry, seromas, hematomas, wound breakdown, wound dehiscence, numbness, nerve and vessel injury, prominent scar, hypertrophic scar, keloid scar, inability to remove all of the excess tissue, reloosening over time, injury to deeper structures, swelling of the mons, DVT, PE, MI, CVA, pneumonia, renal failure and " death. The patient agreed that they understood them all and had all their questions answered to their satisfaction.  His particular case I think the best course of action is a panniculectomy with umbilical transposition.  Showed him where the scar would be and what to expect from the surgery.  We will send the quotes for the procedure(s) if not covered if they want as discussed.  We will get prior authorization for the procedure(s) and then proceed as indicated.      With regards to his chest wall, from a technical standpoint, options include doing nothing versus  mastectomy and possible free nipple graft tattoo.  I had a anika, detailed discussion with the patient in the presence of my nurse (who was present from beginning to end) about chest surgery and the proposed surgery. I was very clear and detailed about overview of surgery, perioperative plans, and expectations.  The excision would go from the front of the chest around the lateral aspect but could not go to the back.  We may use a drain.  Consent obtained.  Photographs obtained.  We will try to get prior authorization.  We will also give him quotes for the procedure if denied by his insurance.  All risks, benefits and alternatives of the procedure including pain, infection, bleeding, scarring, asymmetry, seromas, hematomas, wound breakdown, wound dehiscence, indentation of the area, contour irregularities, recurrence, requirement of further surgeries depending on pathology, numbness of the nipple and areolar areas, DVT, PE, MI, CVA, pneumonia and death were explained.      Quotes for both procedures will be given to the patient and then he will proceed as indicated.  Will let me know if he wants to proceed.      All questions were answered. The patient was happy with the visit. I look forward to helping the patient out in the near future as indicated.       Total time spent in the encounter today including chart review, visit itself, and post-visit  paperwork was 60 minutes.       Juan Antonio Hernandez MD    Chief, Division of Plastic Surgery  Department of Surgery  [facility]      CC: Aedbayo Mares PA-C  [address] [place] [address]  CC: [facility]          Again, thank you for allowing me to participate in the care of your patient.        Sincerely,        SENTHIL Hernandez MD

## 2025-07-21 ENCOUNTER — PATIENT OUTREACH (OUTPATIENT)
Dept: CARE COORDINATION | Facility: CLINIC | Age: 35
End: 2025-07-21
Payer: COMMERCIAL

## 2025-08-04 ENCOUNTER — PATIENT OUTREACH (OUTPATIENT)
Dept: CARE COORDINATION | Facility: CLINIC | Age: 35
End: 2025-08-04
Payer: COMMERCIAL